# Patient Record
Sex: MALE | Race: WHITE | Employment: FULL TIME | URBAN - METROPOLITAN AREA
[De-identification: names, ages, dates, MRNs, and addresses within clinical notes are randomized per-mention and may not be internally consistent; named-entity substitution may affect disease eponyms.]

---

## 2023-01-06 ENCOUNTER — APPOINTMENT (OUTPATIENT)
Dept: CT IMAGING | Age: 53
End: 2023-01-06
Payer: COMMERCIAL

## 2023-01-06 ENCOUNTER — APPOINTMENT (OUTPATIENT)
Dept: ULTRASOUND IMAGING | Age: 53
End: 2023-01-06
Payer: COMMERCIAL

## 2023-01-06 ENCOUNTER — HOSPITAL ENCOUNTER (EMERGENCY)
Age: 53
Discharge: HOME | End: 2023-01-06
Attending: EMERGENCY MEDICINE
Payer: COMMERCIAL

## 2023-01-06 ENCOUNTER — APPOINTMENT (OUTPATIENT)
Dept: GENERAL RADIOLOGY | Age: 53
End: 2023-01-06
Payer: COMMERCIAL

## 2023-01-06 VITALS
DIASTOLIC BLOOD PRESSURE: 96 MMHG | OXYGEN SATURATION: 97 % | SYSTOLIC BLOOD PRESSURE: 149 MMHG | BODY MASS INDEX: 38.91 KG/M2 | RESPIRATION RATE: 18 BRPM | HEIGHT: 73 IN | TEMPERATURE: 98.2 F | HEART RATE: 75 BPM | WEIGHT: 293.6 LBS

## 2023-01-06 DIAGNOSIS — Z72.0 TOBACCO ABUSE: ICD-10-CM

## 2023-01-06 DIAGNOSIS — J18.9 PNEUMONIA OF BOTH LUNGS DUE TO INFECTIOUS ORGANISM, UNSPECIFIED PART OF LUNG: Primary | ICD-10-CM

## 2023-01-06 LAB
ADENOVIRUS BY PCR: NOT DETECTED
ALBUMIN SERPL-MCNC: 4 G/DL (ref 3.5–5.2)
ALP BLD-CCNC: 77 U/L (ref 40–129)
ALT SERPL-CCNC: 29 U/L (ref 0–40)
ANION GAP SERPL CALCULATED.3IONS-SCNC: 11 MMOL/L (ref 7–16)
AST SERPL-CCNC: 30 U/L (ref 0–39)
BASOPHILS ABSOLUTE: 0.02 E9/L (ref 0–0.2)
BASOPHILS RELATIVE PERCENT: 0.3 % (ref 0–2)
BILIRUB SERPL-MCNC: 0.4 MG/DL (ref 0–1.2)
BORDETELLA PARAPERTUSSIS BY PCR: NOT DETECTED
BORDETELLA PERTUSSIS BY PCR: NOT DETECTED
BUN BLDV-MCNC: 12 MG/DL (ref 6–20)
CALCIUM SERPL-MCNC: 9.3 MG/DL (ref 8.6–10.2)
CHLAMYDOPHILIA PNEUMONIAE BY PCR: NOT DETECTED
CHLORIDE BLD-SCNC: 99 MMOL/L (ref 98–107)
CO2: 24 MMOL/L (ref 22–29)
CORONAVIRUS 229E BY PCR: NOT DETECTED
CORONAVIRUS HKU1 BY PCR: NOT DETECTED
CORONAVIRUS NL63 BY PCR: NOT DETECTED
CORONAVIRUS OC43 BY PCR: NOT DETECTED
CREAT SERPL-MCNC: 0.8 MG/DL (ref 0.7–1.2)
D DIMER: 213 NG/ML DDU
EKG ATRIAL RATE: 85 BPM
EKG P AXIS: 82 DEGREES
EKG P-R INTERVAL: 148 MS
EKG Q-T INTERVAL: 406 MS
EKG QRS DURATION: 92 MS
EKG QTC CALCULATION (BAZETT): 483 MS
EKG R AXIS: 50 DEGREES
EKG T AXIS: 27 DEGREES
EKG VENTRICULAR RATE: 85 BPM
EOSINOPHILS ABSOLUTE: 0.03 E9/L (ref 0.05–0.5)
EOSINOPHILS RELATIVE PERCENT: 0.4 % (ref 0–6)
GFR SERPL CREATININE-BSD FRML MDRD: >60 ML/MIN/1.73
GLUCOSE BLD-MCNC: 135 MG/DL (ref 74–99)
HCT VFR BLD CALC: 44.1 % (ref 37–54)
HEMOGLOBIN: 15.1 G/DL (ref 12.5–16.5)
HUMAN METAPNEUMOVIRUS BY PCR: NOT DETECTED
HUMAN RHINOVIRUS/ENTEROVIRUS BY PCR: NOT DETECTED
IMMATURE GRANULOCYTES #: 0.03 E9/L
IMMATURE GRANULOCYTES %: 0.4 % (ref 0–5)
INFLUENZA A H1-2009 BY PCR: DETECTED
INFLUENZA B BY PCR: NOT DETECTED
LACTIC ACID: 1.1 MMOL/L (ref 0.5–2.2)
LIPASE: 28 U/L (ref 13–60)
LYMPHOCYTES ABSOLUTE: 0.97 E9/L (ref 1.5–4)
LYMPHOCYTES RELATIVE PERCENT: 13.1 % (ref 20–42)
MCH RBC QN AUTO: 32.8 PG (ref 26–35)
MCHC RBC AUTO-ENTMCNC: 34.2 % (ref 32–34.5)
MCV RBC AUTO: 95.7 FL (ref 80–99.9)
MONOCYTES ABSOLUTE: 0.73 E9/L (ref 0.1–0.95)
MONOCYTES RELATIVE PERCENT: 9.8 % (ref 2–12)
MYCOPLASMA PNEUMONIAE BY PCR: NOT DETECTED
NEUTROPHILS ABSOLUTE: 5.65 E9/L (ref 1.8–7.3)
NEUTROPHILS RELATIVE PERCENT: 76 % (ref 43–80)
PARAINFLUENZA VIRUS 1 BY PCR: NOT DETECTED
PARAINFLUENZA VIRUS 2 BY PCR: NOT DETECTED
PARAINFLUENZA VIRUS 3 BY PCR: NOT DETECTED
PARAINFLUENZA VIRUS 4 BY PCR: NOT DETECTED
PDW BLD-RTO: 12.5 FL (ref 11.5–15)
PLATELET # BLD: 164 E9/L (ref 130–450)
PMV BLD AUTO: 10.9 FL (ref 7–12)
POTASSIUM SERPL-SCNC: 4.7 MMOL/L (ref 3.5–5)
RBC # BLD: 4.61 E12/L (ref 3.8–5.8)
RESPIRATORY SYNCYTIAL VIRUS BY PCR: NOT DETECTED
SARS-COV-2, NAAT: NOT DETECTED
SARS-COV-2, PCR: NOT DETECTED
SODIUM BLD-SCNC: 134 MMOL/L (ref 132–146)
TOTAL PROTEIN: 7.8 G/DL (ref 6.4–8.3)
TROPONIN, HIGH SENSITIVITY: <6 NG/L (ref 0–11)
WBC # BLD: 7.4 E9/L (ref 4.5–11.5)

## 2023-01-06 PROCEDURE — 96375 TX/PRO/DX INJ NEW DRUG ADDON: CPT

## 2023-01-06 PROCEDURE — 80053 COMPREHEN METABOLIC PANEL: CPT

## 2023-01-06 PROCEDURE — 99285 EMERGENCY DEPT VISIT HI MDM: CPT

## 2023-01-06 PROCEDURE — 6360000002 HC RX W HCPCS: Performed by: EMERGENCY MEDICINE

## 2023-01-06 PROCEDURE — 84484 ASSAY OF TROPONIN QUANT: CPT

## 2023-01-06 PROCEDURE — 76705 ECHO EXAM OF ABDOMEN: CPT

## 2023-01-06 PROCEDURE — 85025 COMPLETE CBC W/AUTO DIFF WBC: CPT

## 2023-01-06 PROCEDURE — 93010 ELECTROCARDIOGRAM REPORT: CPT | Performed by: INTERNAL MEDICINE

## 2023-01-06 PROCEDURE — 6360000002 HC RX W HCPCS

## 2023-01-06 PROCEDURE — 96374 THER/PROPH/DIAG INJ IV PUSH: CPT

## 2023-01-06 PROCEDURE — 93005 ELECTROCARDIOGRAM TRACING: CPT | Performed by: EMERGENCY MEDICINE

## 2023-01-06 PROCEDURE — 74174 CTA ABD&PLVS W/CONTRAST: CPT

## 2023-01-06 PROCEDURE — 83605 ASSAY OF LACTIC ACID: CPT

## 2023-01-06 PROCEDURE — 83690 ASSAY OF LIPASE: CPT

## 2023-01-06 PROCEDURE — 71045 X-RAY EXAM CHEST 1 VIEW: CPT

## 2023-01-06 PROCEDURE — 6370000000 HC RX 637 (ALT 250 FOR IP): Performed by: EMERGENCY MEDICINE

## 2023-01-06 PROCEDURE — 6360000004 HC RX CONTRAST MEDICATION: Performed by: RADIOLOGY

## 2023-01-06 PROCEDURE — 71275 CT ANGIOGRAPHY CHEST: CPT

## 2023-01-06 PROCEDURE — 2580000003 HC RX 258

## 2023-01-06 PROCEDURE — 85378 FIBRIN DEGRADE SEMIQUANT: CPT

## 2023-01-06 PROCEDURE — 0202U NFCT DS 22 TRGT SARS-COV-2: CPT

## 2023-01-06 PROCEDURE — 87635 SARS-COV-2 COVID-19 AMP PRB: CPT

## 2023-01-06 PROCEDURE — 2580000003 HC RX 258: Performed by: EMERGENCY MEDICINE

## 2023-01-06 RX ORDER — MORPHINE SULFATE 4 MG/ML
4 INJECTION, SOLUTION INTRAMUSCULAR; INTRAVENOUS EVERY 4 HOURS PRN
Status: DISCONTINUED | OUTPATIENT
Start: 2023-01-06 | End: 2023-01-06

## 2023-01-06 RX ORDER — MORPHINE SULFATE 4 MG/ML
4 INJECTION, SOLUTION INTRAMUSCULAR; INTRAVENOUS ONCE
Status: COMPLETED | OUTPATIENT
Start: 2023-01-06 | End: 2023-01-06

## 2023-01-06 RX ORDER — DEXAMETHASONE SODIUM PHOSPHATE 10 MG/ML
10 INJECTION INTRAMUSCULAR; INTRAVENOUS ONCE
Status: COMPLETED | OUTPATIENT
Start: 2023-01-06 | End: 2023-01-06

## 2023-01-06 RX ORDER — DOXYCYCLINE HYCLATE 100 MG/1
100 CAPSULE ORAL ONCE
Status: COMPLETED | OUTPATIENT
Start: 2023-01-06 | End: 2023-01-06

## 2023-01-06 RX ORDER — ALBUTEROL SULFATE 90 UG/1
2 AEROSOL, METERED RESPIRATORY (INHALATION) 4 TIMES DAILY PRN
Qty: 18 G | Refills: 2 | Status: SHIPPED | OUTPATIENT
Start: 2023-01-06

## 2023-01-06 RX ORDER — KETOROLAC TROMETHAMINE 30 MG/ML
15 INJECTION, SOLUTION INTRAMUSCULAR; INTRAVENOUS ONCE
Status: COMPLETED | OUTPATIENT
Start: 2023-01-06 | End: 2023-01-06

## 2023-01-06 RX ORDER — KETOROLAC TROMETHAMINE 10 MG/1
10 TABLET, FILM COATED ORAL EVERY 8 HOURS PRN
Qty: 20 TABLET | Refills: 0 | Status: SHIPPED | OUTPATIENT
Start: 2023-01-06

## 2023-01-06 RX ORDER — ONDANSETRON 2 MG/ML
4 INJECTION INTRAMUSCULAR; INTRAVENOUS ONCE
Status: COMPLETED | OUTPATIENT
Start: 2023-01-06 | End: 2023-01-06

## 2023-01-06 RX ORDER — PREDNISONE 20 MG/1
TABLET ORAL
Qty: 18 TABLET | Refills: 0 | Status: SHIPPED | OUTPATIENT
Start: 2023-01-06 | End: 2023-01-16

## 2023-01-06 RX ORDER — CEFDINIR 300 MG/1
300 CAPSULE ORAL 2 TIMES DAILY
Qty: 20 CAPSULE | Refills: 0 | Status: SHIPPED | OUTPATIENT
Start: 2023-01-06 | End: 2023-01-16

## 2023-01-06 RX ORDER — BUDESONIDE AND FORMOTEROL FUMARATE DIHYDRATE 160; 4.5 UG/1; UG/1
2 AEROSOL RESPIRATORY (INHALATION) 2 TIMES DAILY
Qty: 10.2 G | Refills: 0 | Status: SHIPPED | OUTPATIENT
Start: 2023-01-06

## 2023-01-06 RX ORDER — DOXYCYCLINE HYCLATE 100 MG
100 TABLET ORAL 2 TIMES DAILY
Qty: 20 TABLET | Refills: 0 | Status: SHIPPED | OUTPATIENT
Start: 2023-01-06 | End: 2023-01-16

## 2023-01-06 RX ORDER — ONDANSETRON 2 MG/ML
4 INJECTION INTRAMUSCULAR; INTRAVENOUS EVERY 6 HOURS PRN
Status: DISCONTINUED | OUTPATIENT
Start: 2023-01-06 | End: 2023-01-06

## 2023-01-06 RX ORDER — SODIUM CHLORIDE 9 MG/ML
INJECTION, SOLUTION INTRAVENOUS ONCE
Status: COMPLETED | OUTPATIENT
Start: 2023-01-06 | End: 2023-01-06

## 2023-01-06 RX ADMIN — MORPHINE SULFATE 4 MG: 4 INJECTION, SOLUTION INTRAMUSCULAR; INTRAVENOUS at 08:36

## 2023-01-06 RX ADMIN — HYDROMORPHONE HYDROCHLORIDE 1 MG: 1 INJECTION, SOLUTION INTRAMUSCULAR; INTRAVENOUS; SUBCUTANEOUS at 09:58

## 2023-01-06 RX ADMIN — SODIUM CHLORIDE: 9 INJECTION, SOLUTION INTRAVENOUS at 08:38

## 2023-01-06 RX ADMIN — IOPAMIDOL 75 ML: 755 INJECTION, SOLUTION INTRAVENOUS at 10:29

## 2023-01-06 RX ADMIN — DOXYCYCLINE HYCLATE 100 MG: 100 CAPSULE ORAL at 14:47

## 2023-01-06 RX ADMIN — KETOROLAC TROMETHAMINE 15 MG: 30 INJECTION, SOLUTION INTRAMUSCULAR; INTRAVENOUS at 12:51

## 2023-01-06 RX ADMIN — ONDANSETRON 4 MG: 2 INJECTION INTRAMUSCULAR; INTRAVENOUS at 08:34

## 2023-01-06 RX ADMIN — DEXAMETHASONE SODIUM PHOSPHATE 10 MG: 10 INJECTION INTRAMUSCULAR; INTRAVENOUS at 12:53

## 2023-01-06 RX ADMIN — WATER 2000 MG: 1 INJECTION INTRAMUSCULAR; INTRAVENOUS; SUBCUTANEOUS at 14:47

## 2023-01-06 ASSESSMENT — PAIN DESCRIPTION - LOCATION
LOCATION: ABDOMEN;FLANK

## 2023-01-06 ASSESSMENT — PAIN DESCRIPTION - ORIENTATION
ORIENTATION: RIGHT

## 2023-01-06 ASSESSMENT — PAIN SCALES - GENERAL
PAINLEVEL_OUTOF10: 10

## 2023-01-06 ASSESSMENT — PAIN - FUNCTIONAL ASSESSMENT: PAIN_FUNCTIONAL_ASSESSMENT: 0-10

## 2023-01-06 ASSESSMENT — PAIN DESCRIPTION - PAIN TYPE: TYPE: ACUTE PAIN

## 2023-01-06 NOTE — DISCHARGE INSTRUCTIONS
Call family doctor tomorrow and schedule a followup appointment to be seen in 2 days    Call lung doctor to schedule an appointment to be seen in 1 week    Have your doctor obtain  finalized report of all test results    Increase your fluid intake. [employer] or [employer] [place] (6-890.535.5602) if you want help quitting. This is a free telephone support service that can help people who want to stop smoking or using tobacco. Callers are routed to their state quitlines, which offer several types of quit information and services. These may include:     Free support, advice, and counseling from experienced quitline coaches  A personalized quit plan  Practical information on how to quit, including ways to cope with nicotine withdrawal  The latest information about stop-smoking medications  Free or discounted medications (available for at least some callers in most states)  Referrals to other resources  Mailed self-help materials      CTA CHEST W CONTRAST   Final Result   No CT evidence of acute intra-abdominal or pelvic abnormality. There is no   abdominal aortic aneurysm, dissection, or intimal flap. The pelvic vessels   are unremarkable. Ground-glass opacity identified in a patchy distribution seen within the   upper lung fields bilaterally and centrally within the right lower lobe. Findings suggesting an infectious process in which atypical pneumonia or   viral pneumonia cannot be completely excluded. No consolidative infiltrate. Bronchial wall thickening in the perihilar regions bilaterally suggesting a   nonspecific bronchiolitis. No thoracic aortic aneurysm, dissection, or   intimal flap. CTA ABDOMEN PELVIS W CONTRAST   Final Result   No CT evidence of acute intra-abdominal or pelvic abnormality. There is no   abdominal aortic aneurysm, dissection, or intimal flap. The pelvic vessels   are unremarkable.       Ground-glass opacity identified in a patchy distribution seen within the   upper lung fields bilaterally and centrally within the right lower lobe. Findings suggesting an infectious process in which atypical pneumonia or   viral pneumonia cannot be completely excluded. No consolidative infiltrate. Bronchial wall thickening in the perihilar regions bilaterally suggesting a   nonspecific bronchiolitis. No thoracic aortic aneurysm, dissection, or   intimal flap. US ABDOMEN LIMITED Specify organ? GALLBLADDER   Final Result   1. Suboptimal study. No ultrasound evidence of acute cholecystitis. 2.  Common bile duct measures 3 mm. 3.  Mild hepatomegaly. XR CHEST PORTABLE   Final Result   No acute process. A small area on the left can be reimaged at no additional   charge if so desired. See above.

## 2023-01-06 NOTE — Clinical Note
Bekah Mays was seen and treated in our emergency department on 1/6/2023. He may return to work on 01/11/2023. If you have any questions or concerns, please don't hesitate to call.       Soledad Madison, DO

## 2023-01-06 NOTE — Clinical Note
Kimmy Canas was seen and treated in our emergency department on 1/6/2023. He may return to work on 01/11/2023. If you have any questions or concerns, please don't hesitate to call.       Solo Espinal, DO

## 2023-01-06 NOTE — ED PROVIDER NOTES
[facility] ENCOUNTER        Pt Name: Lucie Rodriguez  MRN: [id number]  [place] 1970  Date of evaluation: 1/6/2023  Provider: Rosa Maria Dubon MD  PCP: No primary care provider on file. Note Started: 8:02 AM EST 1/6/23    CHIEF COMPLAINT       Chief Complaint   Patient presents with    Shortness of Breath     Sudden onset pain pain under rt scapula and around rt side       HISTORY OF PRESENT ILLNESS: 1 or more Elements     History from : Patient    Limitations to history : None    Lucie Rodriguez is a 47 y.o. male who presents with abdominal pain. Patient reports sudden onset right upper quadrant pain that started yesterday night, quality is sharp, that radiates to the flank and to the right shoulder, aggravated by bending and twisting, alleviated by nothing. Associated symptoms include some fever, chills. No chest pain, shortness of breath, nausea, vomiting reported. Patient reports of a smoking history (1 pack per 3 days), alcohol history (3-6 beers nightly), intermittent marijuana use. Did not get shingles shot. Patient reports gastric surgery history when he was an infant. Nursing Notes were all reviewed and agreed with or any disagreements were addressed in the HPI. REVIEW OF SYSTEMS :      Review of Systems   Constitutional:  Positive for chills and fever. HENT:  Negative for congestion, rhinorrhea and sore throat. Eyes:  Negative for pain and visual disturbance. Respiratory:  Negative for shortness of breath and wheezing. Cardiovascular:  Negative for chest pain and palpitations. Gastrointestinal:  Positive for abdominal pain. Negative for diarrhea, nausea and vomiting. Genitourinary:  Negative for dysuria and hematuria. Musculoskeletal:  Negative for arthralgias and myalgias. Skin:  Negative for rash. Neurological:  Negative for dizziness and headaches.      Positives and Pertinent negatives as per HPI.     SURGICAL HISTORY     Past Surgical History:   Procedure Laterality Date    STOMACH SURGERY         CURRENTMEDICATIONS       Discharge Medication List as of 1/6/2023  3:07 PM          ALLERGIES     Penicillins    FAMILYHISTORY     No family history on file. SOCIAL HISTORY          SCREENINGS        Gibson Coma Scale  Eye Opening: Spontaneous  Best Verbal Response: Oriented  Best Motor Response: Obeys commands  Gibson Coma Scale Score: 15                CIWA Assessment  BP: (!) 149/96  Heart Rate: 75           PHYSICAL EXAM  1 or more Elements     ED Triage Vitals [01/06/23 0720]   BP Temp Temp Source Heart Rate Resp SpO2 Height Weight   (!) 121/90 98.2 °F (36.8 °C) Bladder 88 16 100 % 6' 1\" (1.854 m) 293 lb 9.6 oz (133.2 kg)       Physical Exam  Vitals and nursing note reviewed. Constitutional:       General: He is in acute distress. Appearance: Normal appearance. He is obese. HENT:      Head: Normocephalic and atraumatic. Eyes:      General:         Right eye: No discharge. Left eye: No discharge. Cardiovascular:      Rate and Rhythm: Normal rate and regular rhythm. Heart sounds: No murmur heard. Pulmonary:      Effort: Pulmonary effort is normal.      Breath sounds: Examination of the right-upper field reveals wheezing. Examination of the left-upper field reveals wheezing. Wheezing present. Chest:      Chest wall: Tenderness (right rib tender to palpation w/ flank to back lower ribs) present. Comments: No rash on chest wall  Abdominal:      General: Bowel sounds are normal.      Palpations: Abdomen is soft. There is no mass. Tenderness: There is abdominal tenderness. There is no guarding or rebound. Musculoskeletal:      Cervical back: Normal range of motion. Right lower leg: No edema. Left lower leg: No edema. Skin:     General: Skin is warm and dry. Neurological:      General: No focal deficit present.       Mental Status: He is alert and oriented to person, place, and time. DIAGNOSTIC RESULTS   LABS:    Labs Reviewed   CBC WITH AUTO DIFFERENTIAL - Abnormal; Notable for the following components:       Result Value    Lymphocytes % 13.1 (*)     Lymphocytes Absolute 0.97 (*)     Eosinophils Absolute 0.03 (*)     All other components within normal limits   COMPREHENSIVE METABOLIC PANEL - Abnormal; Notable for the following components:    Glucose 135 (*)     All other components within normal limits   COVID-19, RAPID   RESPIRATORY PANEL, MOLECULAR, WITH COVID-19   LIPASE   D-DIMER, QUANTITATIVE   LACTIC ACID   TROPONIN       When ordered only abnormal lab results are displayed. All other labs were within normal range or not returned as of this dictation. EKG:     Normal sinus rhythm    RADIOLOGY:   Non-plain film images such as CT, Ultrasound and MRI are read by the radiologist. Plain radiographic images are visualized and preliminarily interpreted by the ED Provider with the below findings:    Luminary imaging reviewed by us, confirmed by radiology    Interpretation per the Radiologist below, if available at the time of this note:    CTA CHEST W CONTRAST   Final Result   No CT evidence of acute intra-abdominal or pelvic abnormality. There is no   abdominal aortic aneurysm, dissection, or intimal flap. The pelvic vessels   are unremarkable. Ground-glass opacity identified in a patchy distribution seen within the   upper lung fields bilaterally and centrally within the right lower lobe. Findings suggesting an infectious process in which atypical pneumonia or   viral pneumonia cannot be completely excluded. No consolidative infiltrate. Bronchial wall thickening in the perihilar regions bilaterally suggesting a   nonspecific bronchiolitis. No thoracic aortic aneurysm, dissection, or   intimal flap. CTA ABDOMEN PELVIS W CONTRAST   Final Result   No CT evidence of acute intra-abdominal or pelvic abnormality.   There is no abdominal aortic aneurysm, dissection, or intimal flap. The pelvic vessels   are unremarkable. Ground-glass opacity identified in a patchy distribution seen within the   upper lung fields bilaterally and centrally within the right lower lobe. Findings suggesting an infectious process in which atypical pneumonia or   viral pneumonia cannot be completely excluded. No consolidative infiltrate. Bronchial wall thickening in the perihilar regions bilaterally suggesting a   nonspecific bronchiolitis. No thoracic aortic aneurysm, dissection, or   intimal flap. US ABDOMEN LIMITED Specify organ? GALLBLADDER   Final Result   1. Suboptimal study. No ultrasound evidence of acute cholecystitis. 2.  Common bile duct measures 3 mm. 3.  Mild hepatomegaly. XR CHEST PORTABLE   Final Result   No acute process. A small area on the left can be reimaged at no additional   charge if so desired. See above. No results found. No results found. PROCEDURES   Unless otherwise noted below, none     Procedures    CRITICAL CARE TIME       PAST MEDICAL HISTORY      has a past medical history of Obesity (BMI 35.0-39.9 without comorbidity).      EMERGENCY DEPARTMENT COURSE and DIFFERENTIAL DIAGNOSIS/MDM:   Vitals:    Vitals:    01/06/23 1126 01/06/23 1251 01/06/23 1430 01/06/23 1450   BP:  (!) 142/84 (!) 148/93 (!) 149/96   Pulse: 87 79 74 75   Resp: 19 20 20 18   Temp:       TempSrc:       SpO2: 99% 99% 98% 97%   Weight:       Height:           Patient was given the following medications:  Medications   0.9 % sodium chloride infusion (0 mL/hr IntraVENous Stopped 1/6/23 1525)   morphine sulfate (PF) injection 4 mg (4 mg IntraVENous Given 1/6/23 0836)   ondansetron (ZOFRAN) injection 4 mg (4 mg IntraVENous Given 1/6/23 0834)   HYDROmorphone (DILAUDID) injection 1 mg (1 mg IntraVENous Given 1/6/23 0958)   iopamidol (ISOVUE-370) 76 % injection 75 mL (75 mLs IntraVENous Given 1/6/23 1029) ketorolac (TORADOL) injection 15 mg (15 mg IntraVENous Given 23 1251)   dexamethasone (DECADRON) injection 10 mg (10 mg IntraVENous Given 23 1253)   doxycycline hyclate (VIBRAMYCIN) capsule 100 mg (100 mg Oral Given 23 1447)   cefTRIAXone (ROCEPHIN) 2,000 mg in sterile water 20 mL IV syringe (2,000 mg IntraVENous Given 23 1447)       ED Course as of 23 1534      2728 ECam  Normal sinus rhythm  Hr 85  Qtc prolonged, no ischemic findings, normal axis  Stable ecg [AYLIN]   7236 Discussed patient's work-up including ultrasound which showed no evidence of acute pathology. Patient still reports significant pain in his right flank. Will order CT of this abdomen pelvis and chest to rule out dissection. We will give him Dilaudid and reevaluate patient. [AD]   4057 Spoke to patient and patient's wife, updated them on the results of imaging, ruling out any acute processes but showing concern for pneumonia. Patient still complains about significant pain on the right rib region, reporting Dilaudid helps for about an hour. Give patient Toradol and reevaluate. [AD]   0266 Evaluated patient after giving him steroids and Toradol. Patient reports that he is feeling better although still complains of some discomfort in his rib cage but is generally improving.  [AD]   4947 Discussed with patient plan of care. Patient tolerated walking pulse ox. Patient reports feeling much better after Toradol shot. [AD]   3793 I did personally discuss and counseled patient on smoking cessation and benefits thereof:    Lowered risk for lung cancer and many other types of cancer. Reduced risk for heart disease, stroke, and peripheral vascular disease (narrowing of the blood vessels outside your heart). Reduced heart disease risk within 1 to 2 years of quitting. Reduced respiratory symptoms, such as coughing, wheezing, and shortness of breath.  While these symptoms may not disappear, they do not continue to progress at the same rate among people who quit compared with those who continue to smoke. Reduced risk of developing some lung diseases (such as chronic obstructive pulmonary disease, also known as COPD, one of the leading causes of death in the [place]). Reduced risk for infertility in women of childbearing age. Women who stop smoking during pregnancy also reduce their risk of having a low birth weight baby. I also discussed benefits on counseling and Nicotine replacement products which include  Over-the-counter (nicotine patch [which is also available by prescription], gum, lozenge) and Prescription (nicotine patch, inhaler, nasal spray) and  Prescription non-nicotine medications: bupropion SR (Zyban®),6 varenicline tartrate (Chantix®)    He was given the following    [employer] 1-800-QUIT-NOWexternal icon (1-177.565.9955) if you want help quitting. This is a free telephone support service that can help people who want to stop smoking or using tobacco. Callers are routed to their state quitlines, which offer several types of quit information and services. These may include:     Free support, advice, and counseling from experienced quitline coaches  A personalized quit plan  Practical information on how to quit, including ways to cope with nicotine withdrawal  The latest information about stop-smoking medications  Free or discounted medications (available for at least some callers in most states)  Referrals to other resources  Mailed self-help materials     [AYLIN]      ED Course User Index  [AD] Shaan Cortés MD  [AYLIN] Roque Alexandero, DO        [unfilled]    Chronic Conditions:     CONSULTS: (Who and What was discussed)  None    CC/HPI Summary, DDx, ED Course, and Reassessment: 50-year-old male presenting with right upper quadrant pain. Differentials include gastric ulcer, cholecystitis, pancreatitis, MSK, pneumothorax, to name a few.   Upon further investigation, work-up ruled out any acute concerning pathology but imaging was concerning for pneumonia. Patient was swabbed for COVID which was negative and underwent RFA panel. Patient was given antibiotics for pneumonia and we will continue antibiotic course on an outpatient setting. Patient was given fluids, pain medication, steroids during ED course. Upon reassessment patient reported improved symptoms. Discussed with patient results, including blood work and imaging. Patient will follow-up with PCP in the next 2 to 3 days. Patient counseled on smoking cessation, was given time for questions and verbalized understanding with plan going forward. Patient will be discharged home in stable condition. Disposition Considerations (tests considered but not done, Shared Decision Making, Pt Expectation of Test or Tx.):       I am the Primary Clinician of Record. FINAL IMPRESSION      1. Pneumonia of both lungs due to infectious organism, unspecified part of lung    2. Tobacco abuse          DISPOSITION/PLAN     DISPOSITION Decision To Discharge 01/06/2023 03:08:52 PM      PATIENT REFERRED TO:  Javier Harris MD  [address]  [phone]    Schedule an appointment as soon as possible for a visit   family medicine    Rowena Pizano DO  [address] [facility]  [phone]    Schedule an appointment as soon as possible for a visit       DISCHARGE MEDICATIONS:  Discharge Medication List as of 1/6/2023  3:07 PM        START taking these medications    Details   predniSONE (DELTASONE) 20 MG tablet Sig.: Take 60mg  Po qd x 3 days, then 40mg po qd x3 days, then 20mg po qd x 3 days.   QS x 9 days, Disp-18 tablet, R-0Print      albuterol sulfate HFA (VENTOLIN HFA) 108 (90 Base) MCG/ACT inhaler Inhale 2 puffs into the lungs 4 times daily as needed for Wheezing, Disp-18 g, R-2Print      ketorolac (TORADOL) 10 MG tablet Take 1 tablet by mouth every 8 hours as needed for Pain, Disp-20 tablet, R-0Print      budesonide-formoterol (SYMBICORT) 160-4.5 MCG/ACT AERO Inhale 2 puffs into the lungs 2 times daily, Disp-10.2 g, R-0Print      cefdinir (OMNICEF) 300 MG capsule Take 1 capsule by mouth 2 times daily for 10 days, Disp-20 capsule, R-0Print      doxycycline hyclate (VIBRA-TABS) 100 MG tablet Take 1 tablet by mouth 2 times daily for 10 days, Disp-20 tablet, R-0Print             DISCONTINUED MEDICATIONS:  Discharge Medication List as of 1/6/2023  3:07 PM                 (Please note that portions of this note were completed with a voice recognition program.  Efforts were made to edit the dictations but occasionally words are mis-transcribed.)    Yoanna Malcolm MD (electronically signed)          Yoanna Malcolm MD  Resident  01/06/23 6253

## 2023-01-16 SDOH — HEALTH STABILITY: PHYSICAL HEALTH: ON AVERAGE, HOW MANY MINUTES DO YOU ENGAGE IN EXERCISE AT THIS LEVEL?: 30 MIN

## 2023-01-16 SDOH — HEALTH STABILITY: PHYSICAL HEALTH: ON AVERAGE, HOW MANY DAYS PER WEEK DO YOU ENGAGE IN MODERATE TO STRENUOUS EXERCISE (LIKE A BRISK WALK)?: 5 DAYS

## 2023-01-16 ASSESSMENT — SOCIAL DETERMINANTS OF HEALTH (SDOH)
WITHIN THE LAST YEAR, HAVE TO BEEN RAPED OR FORCED TO HAVE ANY KIND OF SEXUAL ACTIVITY BY YOUR PARTNER OR EX-PARTNER?: NO
WITHIN THE LAST YEAR, HAVE YOU BEEN HUMILIATED OR EMOTIONALLY ABUSED IN OTHER WAYS BY YOUR PARTNER OR EX-PARTNER?: NO
WITHIN THE LAST YEAR, HAVE YOU BEEN KICKED, HIT, SLAPPED, OR OTHERWISE PHYSICALLY HURT BY YOUR PARTNER OR EX-PARTNER?: NO
WITHIN THE LAST YEAR, HAVE YOU BEEN AFRAID OF YOUR PARTNER OR EX-PARTNER?: NO

## 2023-01-17 ENCOUNTER — OFFICE VISIT (OUTPATIENT)
Dept: FAMILY MEDICINE CLINIC | Age: 53
End: 2023-01-17
Payer: COMMERCIAL

## 2023-01-17 VITALS
OXYGEN SATURATION: 97 % | WEIGHT: 308 LBS | BODY MASS INDEX: 40.82 KG/M2 | RESPIRATION RATE: 16 BRPM | DIASTOLIC BLOOD PRESSURE: 94 MMHG | HEART RATE: 79 BPM | SYSTOLIC BLOOD PRESSURE: 146 MMHG | HEIGHT: 73 IN | TEMPERATURE: 97.5 F

## 2023-01-17 DIAGNOSIS — Z71.6 ENCOUNTER FOR SMOKING CESSATION COUNSELING: ICD-10-CM

## 2023-01-17 DIAGNOSIS — R73.02 IMPAIRED GLUCOSE TOLERANCE: ICD-10-CM

## 2023-01-17 DIAGNOSIS — E66.01 CLASS 3 SEVERE OBESITY DUE TO EXCESS CALORIES WITHOUT SERIOUS COMORBIDITY WITH BODY MASS INDEX (BMI) OF 40.0 TO 44.9 IN ADULT (HCC): ICD-10-CM

## 2023-01-17 DIAGNOSIS — F10.10 ALCOHOL ABUSE: ICD-10-CM

## 2023-01-17 DIAGNOSIS — I10 ESSENTIAL HYPERTENSION: Primary | ICD-10-CM

## 2023-01-17 PROBLEM — E66.813 CLASS 3 SEVERE OBESITY DUE TO EXCESS CALORIES WITHOUT SERIOUS COMORBIDITY WITH BODY MASS INDEX (BMI) OF 40.0 TO 44.9 IN ADULT: Status: ACTIVE | Noted: 2023-01-17

## 2023-01-17 LAB — HBA1C MFR BLD: 6 %

## 2023-01-17 PROCEDURE — 99203 OFFICE O/P NEW LOW 30 MIN: CPT | Performed by: FAMILY MEDICINE

## 2023-01-17 PROCEDURE — 3078F DIAST BP <80 MM HG: CPT | Performed by: FAMILY MEDICINE

## 2023-01-17 PROCEDURE — 83036 HEMOGLOBIN GLYCOSYLATED A1C: CPT | Performed by: FAMILY MEDICINE

## 2023-01-17 PROCEDURE — 4004F PT TOBACCO SCREEN RCVD TLK: CPT | Performed by: FAMILY MEDICINE

## 2023-01-17 PROCEDURE — G8427 DOCREV CUR MEDS BY ELIG CLIN: HCPCS | Performed by: FAMILY MEDICINE

## 2023-01-17 PROCEDURE — G8484 FLU IMMUNIZE NO ADMIN: HCPCS | Performed by: FAMILY MEDICINE

## 2023-01-17 PROCEDURE — 3017F COLORECTAL CA SCREEN DOC REV: CPT | Performed by: FAMILY MEDICINE

## 2023-01-17 PROCEDURE — G8417 CALC BMI ABV UP PARAM F/U: HCPCS | Performed by: FAMILY MEDICINE

## 2023-01-17 PROCEDURE — 3074F SYST BP LT 130 MM HG: CPT | Performed by: FAMILY MEDICINE

## 2023-01-17 RX ORDER — HYDROCHLOROTHIAZIDE 12.5 MG/1
12.5 CAPSULE, GELATIN COATED ORAL EVERY MORNING
Qty: 90 CAPSULE | Refills: 1 | Status: SHIPPED | OUTPATIENT
Start: 2023-01-17

## 2023-01-17 RX ORDER — BUPROPION HYDROCHLORIDE 150 MG/1
150 TABLET ORAL EVERY MORNING
Qty: 30 TABLET | Refills: 3 | Status: SHIPPED | OUTPATIENT
Start: 2023-01-17

## 2023-01-17 RX ORDER — OMEPRAZOLE 20 MG/1
20 CAPSULE, DELAYED RELEASE ORAL DAILY
COMMUNITY

## 2023-01-17 SDOH — ECONOMIC STABILITY: FOOD INSECURITY: WITHIN THE PAST 12 MONTHS, THE FOOD YOU BOUGHT JUST DIDN'T LAST AND YOU DIDN'T HAVE MONEY TO GET MORE.: NEVER TRUE

## 2023-01-17 SDOH — ECONOMIC STABILITY: FOOD INSECURITY: WITHIN THE PAST 12 MONTHS, YOU WORRIED THAT YOUR FOOD WOULD RUN OUT BEFORE YOU GOT MONEY TO BUY MORE.: NEVER TRUE

## 2023-01-17 ASSESSMENT — PATIENT HEALTH QUESTIONNAIRE - PHQ9
SUM OF ALL RESPONSES TO PHQ QUESTIONS 1-9: 0
1. LITTLE INTEREST OR PLEASURE IN DOING THINGS: 0
SUM OF ALL RESPONSES TO PHQ QUESTIONS 1-9: 0
2. FEELING DOWN, DEPRESSED OR HOPELESS: 0
SUM OF ALL RESPONSES TO PHQ9 QUESTIONS 1 & 2: 0
SUM OF ALL RESPONSES TO PHQ QUESTIONS 1-9: 0
SUM OF ALL RESPONSES TO PHQ QUESTIONS 1-9: 0

## 2023-01-17 ASSESSMENT — SOCIAL DETERMINANTS OF HEALTH (SDOH): HOW HARD IS IT FOR YOU TO PAY FOR THE VERY BASICS LIKE FOOD, HOUSING, MEDICAL CARE, AND HEATING?: NOT HARD AT ALL

## 2023-01-17 NOTE — PROGRESS NOTES
1/22/2023    Gena Gabriel is a 47 y.o. malehere for:    HPI:    Patient is here to establish with us today. He was recently seen in the ER due to right upper quadrant pain which was sharp radiating to his flank area. CBC, CMP, lipase, D-dimer, lactic acid was normal ,COVID and negative RFA. ultrasound was negative for acute cholecystitis normal bile duct diameter and mild hepatomegaly. CTh showed groundglass opacity in upper lung fields bilaterally and centrally with the right lower lobe which could be atypical pneumonia or viral pneumonia. It also showed bronchial wall thickening which can be bronchiolitis. Pt has finished omnicef, doxy, symbicort and is on albuterol prn. Smokes 1 pack per 4 days, alcohol 6 beers every night and intermittent marijuana use. No other medical problems. Patient is not on any medications. Patient denies any allergies. Still having RUQ pain  Not as bad as it was   Still there       BP Readings from Last 3 Encounters:   01/17/23 (!) 146/94   01/06/23 (!) 149/96     Current Outpatient Medications   Medication Sig Dispense Refill    omeprazole (PRILOSEC) 20 MG delayed release capsule Take 20 mg by mouth daily Takes as needed      buPROPion (WELLBUTRIN XL) 150 MG extended release tablet Take 1 tablet by mouth every morning 30 tablet 3    hydroCHLOROthiazide (MICROZIDE) 12.5 MG capsule Take 1 capsule by mouth every morning 90 capsule 1    albuterol sulfate HFA (VENTOLIN HFA) 108 (90 Base) MCG/ACT inhaler Inhale 2 puffs into the lungs 4 times daily as needed for Wheezing 18 g 2    ketorolac (TORADOL) 10 MG tablet Take 1 tablet by mouth every 8 hours as needed for Pain 20 tablet 0    budesonide-formoterol (SYMBICORT) 160-4.5 MCG/ACT AERO Inhale 2 puffs into the lungs 2 times daily 10.2 g 0     No current facility-administered medications for this visit.       Allergies   Allergen Reactions    Penicillins Rash       Past Medical & Surgical History:      Diagnosis Date Obesity (BMI 35.0-39.9 without comorbidity)      Past Surgical History:   Procedure Laterality Date    STOMACH SURGERY         Family History:  No family history on file.    Social History:  Social History     Tobacco Use    Smoking status: Every Day     Packs/day: 0.25     Years: 40.00     Pack years: 10.00     Types: Cigarettes    Smokeless tobacco: Never   Substance Use Topics    Alcohol use: Not on file     Comment: 6 pack a day         There is no immunization history on file for this patient.    Review of Systems   Constitutional:  Negative for chills and fever.   HENT:  Negative for congestion, sore throat and trouble swallowing.    Respiratory:  Negative for cough.    Cardiovascular:  Negative for chest pain and leg swelling.   Gastrointestinal:  Positive for abdominal pain. Negative for constipation, diarrhea, nausea and vomiting.   Genitourinary:  Negative for difficulty urinating.   Musculoskeletal:  Negative for arthralgias and myalgias.   Skin:  Negative for rash and wound.   Neurological:  Negative for dizziness and headaches.   Psychiatric/Behavioral:  Negative for agitation.      VS:  BP (!) 146/94   Pulse 79   Temp 97.5 °F (36.4 °C)   Resp 16   Ht 6' 1\" (1.854 m)   Wt (!) 308 lb (139.7 kg)   SpO2 97%   BMI 40.64 kg/m²     Physical Exam  Constitutional:       Appearance: Normal appearance.   HENT:      Head: Normocephalic.      Nose: Nose normal. No rhinorrhea.   Eyes:      General: No scleral icterus.     Conjunctiva/sclera: Conjunctivae normal.   Cardiovascular:      Rate and Rhythm: Normal rate and regular rhythm.      Pulses: Normal pulses.      Heart sounds: Normal heart sounds. No murmur heard.  Pulmonary:      Breath sounds: Normal breath sounds. No wheezing, rhonchi or rales.   Abdominal:      General: Abdomen is flat. Bowel sounds are normal.   Musculoskeletal:      Right lower leg: No edema.      Left lower leg: No edema.   Skin:     General: Skin is warm.      Findings: No rash.  Neurological:      General: No focal deficit present. Mental Status: He is alert. Assessment/Plan:  1. Impaired glucose tolerance  A1C today is 6.0  At this time pt was given option to start metformin however him and daughter chose to start lifestyle modifications and physical activity  Therefore we will observe pt for 3 months  - POCT glycosylated hemoglobin (Hb A1C)    2. Encounter for smoking cessation counseling  At this time pt we will order PFT   Pt willing to start wellbutrin today, discussed benefits and side efffects  - Full PFT Study With Bronchodilator; Future    3. Class 3 severe obesity due to excess calories without serious comorbidity with body mass index (BMI) of 40.0 to 44.9 in adult [facility])  Labs and screenings today  - Fecal DNA Colorectal cancer screening (Cologuard)  - LIPID PANEL; Future  - POCT glycosylated hemoglobin (Hb A1C)    4. Essential hypertension  At this time we will start pt on hctz daily   Discussed physical activity and salt restriction  - Microalbumin / Creatinine Urine Ratio; Future    5 COPD   Unsure diagnosis and severity, will order PFT at this time   Started wellbutrin for smoking cessation  Continue symbicort in the meantime    RTC 6 weeks     Follow up:  4 week for HTN    Patient agrees with the above stated plan. Kim Arango received counseling on the following healthy behaviors: nutrition, exercise, medication adherence, tobacco cessation, and decrease in alcohol consumption.     Marissa Arthur MD  PGY-3 Family Medicine

## 2023-01-17 NOTE — PROGRESS NOTES
S: 47 y.o. 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 His temperature is 97.5 °F (36.4 °C). His blood pressure is 141/93 (abnormal) and his pulse is 79. His respiration is 16 and oxygen saturation is 97%. BP Readings from Last 3 Encounters:   01/17/23 (!) 141/93   01/06/23 (!) 149/96     See resident note      Impression/Plan:   1) HTN: Start HCTZ 12.5mg, diet/lifestyle interventions, check lipids, decrease alcohol intake  2) Possible COPD: Continue symbicort with albuterol prn, +PFTs, +encourage smoking cessation  3) Tobacco use: Trial of wellbutrin  4) Prediabetes: Diet/lifestyle changes  5) Obesity: Diet/lifestyle changes  6) HM: Cologuard, declines vaccines    RTC 6 weeks         Health Maintenance Due   Topic Date Due    COVID-19 Vaccine (1) Never done    Depression Screen  Never done    HIV screen  Never done    Hepatitis C screen  Never done    DTaP/Tdap/Td vaccine (1 - Tdap) Never done    Diabetes screen  Never done    Lipids  Never done    Colorectal Cancer Screen  Never done    Shingles vaccine (1 of 2) Never done    Flu vaccine (1) Never done         Attending Physician Statement  I have discussed the case, including pertinent history and exam findings with the resident. I agree with the documented assessment and plan.       Rui Tripathi MD

## 2023-01-22 PROBLEM — F10.10 ALCOHOL ABUSE: Status: ACTIVE | Noted: 2023-01-22

## 2023-01-22 ASSESSMENT — ENCOUNTER SYMPTOMS
DIARRHEA: 0
VOMITING: 0
COUGH: 0
CONSTIPATION: 0
NAUSEA: 0
SORE THROAT: 0
ABDOMINAL PAIN: 1
TROUBLE SWALLOWING: 0

## 2023-02-16 DIAGNOSIS — E66.01 CLASS 3 SEVERE OBESITY DUE TO EXCESS CALORIES WITHOUT SERIOUS COMORBIDITY WITH BODY MASS INDEX (BMI) OF 40.0 TO 44.9 IN ADULT (HCC): ICD-10-CM

## 2023-02-16 DIAGNOSIS — I10 ESSENTIAL HYPERTENSION: ICD-10-CM

## 2023-02-16 LAB
CHOLESTEROL, TOTAL: 206 MG/DL (ref 0–199)
CREATININE URINE: 99 MG/DL (ref 40–278)
HDLC SERPL-MCNC: 78 MG/DL
LDL CHOLESTEROL CALCULATED: 86 MG/DL (ref 0–99)
MICROALBUMIN UR-MCNC: 40.8 MG/L
MICROALBUMIN/CREAT UR-RTO: 41.2 (ref 0–30)
TRIGL SERPL-MCNC: 209 MG/DL (ref 0–149)
VLDLC SERPL CALC-MCNC: 42 MG/DL

## 2023-03-04 SDOH — ECONOMIC STABILITY: TRANSPORTATION INSECURITY
IN THE PAST 12 MONTHS, HAS LACK OF TRANSPORTATION KEPT YOU FROM MEETINGS, WORK, OR FROM GETTING THINGS NEEDED FOR DAILY LIVING?: NO

## 2023-03-04 SDOH — ECONOMIC STABILITY: HOUSING INSECURITY
IN THE LAST 12 MONTHS, WAS THERE A TIME WHEN YOU DID NOT HAVE A STEADY PLACE TO SLEEP OR SLEPT IN A SHELTER (INCLUDING NOW)?: NO

## 2023-03-04 SDOH — ECONOMIC STABILITY: FOOD INSECURITY: WITHIN THE PAST 12 MONTHS, YOU WORRIED THAT YOUR FOOD WOULD RUN OUT BEFORE YOU GOT MONEY TO BUY MORE.: NEVER TRUE

## 2023-03-04 SDOH — ECONOMIC STABILITY: INCOME INSECURITY: HOW HARD IS IT FOR YOU TO PAY FOR THE VERY BASICS LIKE FOOD, HOUSING, MEDICAL CARE, AND HEATING?: PATIENT DECLINED

## 2023-03-04 SDOH — ECONOMIC STABILITY: FOOD INSECURITY: WITHIN THE PAST 12 MONTHS, THE FOOD YOU BOUGHT JUST DIDN'T LAST AND YOU DIDN'T HAVE MONEY TO GET MORE.: NEVER TRUE

## 2023-03-06 ENCOUNTER — OFFICE VISIT (OUTPATIENT)
Dept: FAMILY MEDICINE CLINIC | Age: 53
End: 2023-03-06
Payer: COMMERCIAL

## 2023-03-06 VITALS
WEIGHT: 306 LBS | RESPIRATION RATE: 18 BRPM | DIASTOLIC BLOOD PRESSURE: 84 MMHG | SYSTOLIC BLOOD PRESSURE: 138 MMHG | HEART RATE: 72 BPM | HEIGHT: 73 IN | BODY MASS INDEX: 40.56 KG/M2 | OXYGEN SATURATION: 98 %

## 2023-03-06 DIAGNOSIS — R73.03 PREDIABETES: ICD-10-CM

## 2023-03-06 DIAGNOSIS — I10 ESSENTIAL HYPERTENSION: Primary | ICD-10-CM

## 2023-03-06 PROCEDURE — G8417 CALC BMI ABV UP PARAM F/U: HCPCS | Performed by: FAMILY MEDICINE

## 2023-03-06 PROCEDURE — 3074F SYST BP LT 130 MM HG: CPT | Performed by: FAMILY MEDICINE

## 2023-03-06 PROCEDURE — G8484 FLU IMMUNIZE NO ADMIN: HCPCS | Performed by: FAMILY MEDICINE

## 2023-03-06 PROCEDURE — 99214 OFFICE O/P EST MOD 30 MIN: CPT | Performed by: FAMILY MEDICINE

## 2023-03-06 PROCEDURE — 3078F DIAST BP <80 MM HG: CPT | Performed by: FAMILY MEDICINE

## 2023-03-06 PROCEDURE — 3017F COLORECTAL CA SCREEN DOC REV: CPT | Performed by: FAMILY MEDICINE

## 2023-03-06 PROCEDURE — 4004F PT TOBACCO SCREEN RCVD TLK: CPT | Performed by: FAMILY MEDICINE

## 2023-03-06 PROCEDURE — G8427 DOCREV CUR MEDS BY ELIG CLIN: HCPCS | Performed by: FAMILY MEDICINE

## 2023-03-06 RX ORDER — BUDESONIDE AND FORMOTEROL FUMARATE DIHYDRATE 160; 4.5 UG/1; UG/1
2 AEROSOL RESPIRATORY (INHALATION) 2 TIMES DAILY
Qty: 10.2 G | Refills: 0 | Status: SHIPPED | OUTPATIENT
Start: 2023-03-06

## 2023-03-06 RX ORDER — BUDESONIDE AND FORMOTEROL FUMARATE DIHYDRATE 80; 4.5 UG/1; UG/1
AEROSOL RESPIRATORY (INHALATION) 2 TIMES DAILY
Status: CANCELLED | OUTPATIENT
Start: 2023-03-06

## 2023-03-06 NOTE — PROGRESS NOTES
S: 47 y.o. male with   Chief Complaint   Patient presents with    Hypertension       Pt tried to take the HCTZ and felt light headed. His daughter is a nurse and the BP's at home are well controlled. He is watching his salt intake. He walks daily. O: VS:  height is 6' 1\" (1.854 m) and weight is 306 lb (138.8 kg) (abnormal). His blood pressure is 138/84 and his pulse is 72. His respiration is 18 and oxygen saturation is 98%. BP Readings from Last 3 Encounters:   03/06/23 138/84   01/17/23 (!) 146/94   01/06/23 (!) 149/96     See resident note    Impression/Plan:   1) HTN  - controlled by diet and exercise, labs today. 2) preDM - cont to control with diet and exercise  3) SOB - Cont on symbicort. Consider PFT. The 10-year ASCVD risk score (Sridhar NAVA, et al., 2019) is: 7.2%    Values used to calculate the score:      Age: 47 years      Sex: Male      Is Non- : No      Diabetic: No      Tobacco smoker: Yes      Systolic Blood Pressure: 504 mmHg      Is BP treated: No      HDL Cholesterol: 78 mg/dL      Total Cholesterol: 206 mg/dL      Health Maintenance Due   Topic Date Due    COVID-19 Vaccine (1) Never done    Pneumococcal 0-64 years Vaccine (1 - PCV) Never done    HIV screen  Never done    Hepatitis C screen  Never done    DTaP/Tdap/Td vaccine (1 - Tdap) Never done    Colorectal Cancer Screen  Never done    Shingles vaccine (1 of 2) Never done    Flu vaccine (1) Never done         Attending Physician Statement  I have discussed the case, including pertinent history and exam findings with the resident. I agree with the documented assessment and plan.       Grace Briceno MD

## 2023-03-06 NOTE — PROGRESS NOTES
3/8/2023    Patricia Lemus is a 47 y.o. malehere for:    HPI:    Here for HTN fu  Last time he was here he was started on microzide  He said he tried Microzide and took for 9 days but did had some side effects  Was orthostatic lightheaded , hence he stopped taking it  Quit taking it  Checks BP) at home   Has been wathcing his salt   Walking about 1/2- 1 hour , walking with dog    The 10-year ASCVD risk score (Sridhar NAVA, et al., 2019) is: 7.2%    Values used to calculate the score:      Age: 47 years      Sex: Male      Is Non- : No      Diabetic: No      Tobacco smoker: Yes      Systolic Blood Pressure: 832 mmHg      Is BP treated: No      HDL Cholesterol: 78 mg/dL      Total Cholesterol: 206 mg/dL    Pre-diabetes : managing with diet and lifestyle changes       BP Readings from Last 3 Encounters:   03/06/23 138/84   01/17/23 (!) 146/94   01/06/23 (!) 149/96     Current Outpatient Medications   Medication Sig Dispense Refill    budesonide-formoterol (SYMBICORT) 160-4.5 MCG/ACT AERO Inhale 2 puffs into the lungs 2 times daily 10.2 g 0    omeprazole (PRILOSEC) 20 MG delayed release capsule Take 20 mg by mouth daily Takes as needed      buPROPion (WELLBUTRIN XL) 150 MG extended release tablet Take 1 tablet by mouth every morning 30 tablet 3    albuterol sulfate HFA (VENTOLIN HFA) 108 (90 Base) MCG/ACT inhaler Inhale 2 puffs into the lungs 4 times daily as needed for Wheezing 18 g 2     No current facility-administered medications for this visit. Allergies   Allergen Reactions    Penicillins Rash       Past Medical & Surgical History:      Diagnosis Date    Obesity (BMI 35.0-39.9 without comorbidity)      Past Surgical History:   Procedure Laterality Date    STOMACH SURGERY         Family History:  No family history on file.     Social History:  Social History     Tobacco Use    Smoking status: Every Day     Packs/day: 0.25     Years: 40.00     Pack years: 10.00     Types: Cigarettes Smokeless tobacco: Never   Substance Use Topics    Alcohol use: Not on file     Comment: 6 pack a day         There is no immunization history on file for this patient. Review of Systems   Constitutional:  Negative for chills and fever. HENT:  Negative for congestion, sore throat and trouble swallowing. Respiratory:  Negative for cough. Cardiovascular:  Negative for chest pain and leg swelling. Gastrointestinal:  Negative for abdominal pain, constipation, diarrhea, nausea and vomiting. Genitourinary:  Negative for difficulty urinating. Musculoskeletal:  Negative for arthralgias and myalgias. Skin:  Negative for rash and wound. Neurological:  Negative for dizziness and headaches. Psychiatric/Behavioral:  Negative for agitation. VS:  /84   Pulse 72   Resp 18   Ht 6' 1\" (1.854 m)   Wt (!) 306 lb (138.8 kg)   SpO2 98%   BMI 40.37 kg/m²     Physical Exam  Constitutional:       Appearance: Normal appearance. HENT:      Head: Normocephalic. Nose: Nose normal. No rhinorrhea. Eyes:      General: No scleral icterus. Conjunctiva/sclera: Conjunctivae normal.   Cardiovascular:      Rate and Rhythm: Normal rate and regular rhythm. Pulses: Normal pulses. Heart sounds: Normal heart sounds. No murmur heard. Pulmonary:      Breath sounds: Normal breath sounds. No wheezing, rhonchi or rales. Abdominal:      General: Abdomen is flat. Bowel sounds are normal.   Musculoskeletal:      Right lower leg: No edema. Left lower leg: No edema. Skin:     General: Skin is warm. Findings: No rash. Neurological:      General: No focal deficit present. Mental Status: He is alert. Assessment/Plan:  1. Essential hypertension  Pt has lost lot of weight and would like to continue managing BP with meds and exercise  Pt will bring his BP numbers from home random checks   - LIPID PANEL; Future  - Hemoglobin A1C; Future  - Comprehensive Metabolic Panel; Future    2. Prediabetes   Continue diet and exercise    Pt has PFT scheduled for next week , given hx of smoking     Follow up:  3 mo     Patient agrees with the above stated plan. Carlene Wray received counseling on the following healthy behaviors: nutrition and exercise.     Allegra Moss MD  PGY-3 Family Medicine

## 2023-03-08 PROBLEM — R73.03 PREDIABETES: Status: ACTIVE | Noted: 2023-03-08

## 2023-03-08 ASSESSMENT — ENCOUNTER SYMPTOMS
CONSTIPATION: 0
ABDOMINAL PAIN: 0
SORE THROAT: 0
NAUSEA: 0
DIARRHEA: 0
VOMITING: 0
COUGH: 0
TROUBLE SWALLOWING: 0

## 2023-03-23 ENCOUNTER — HOSPITAL ENCOUNTER (OUTPATIENT)
Dept: PULMONOLOGY | Age: 53
Discharge: HOME | End: 2023-03-23
Payer: COMMERCIAL

## 2023-03-23 DIAGNOSIS — Z71.6 ENCOUNTER FOR SMOKING CESSATION COUNSELING: ICD-10-CM

## 2023-03-23 PROCEDURE — 94726 PLETHYSMOGRAPHY LUNG VOLUMES: CPT

## 2023-03-23 PROCEDURE — 94060 EVALUATION OF WHEEZING: CPT

## 2023-03-23 PROCEDURE — 94729 DIFFUSING CAPACITY: CPT

## 2023-03-24 NOTE — PROCEDURES
[id number] [address]                               PULMONARY FUNCTION    PATIENT NAME: Maya TATUM                       :        1970  MED REC NO:   [id number]                            ROOM:  ACCOUNT NO:   [de-identified]                           ADMIT DATE: 2023  PROVIDER:     Hira Peralta MD    DATE OF PROCEDURE:  2023    The patient showed excellent effort and cooperation for the test showing  moderate obstructive lung disease with no significant improvement with  bronchodilators and diffusion capacity is normal.  FVC is 4.05 liters,  72% of predicted and FEV1 of 2.78 liters, 64% of predicted. There is no  significant improvement with bronchodilators. The FEV1 3.05 liters with  a 9% change. DLCO is 128%.         Chaparro Salinas MD    D: 2023 15:28:15       T: 2023 15:59:24     [id number]  Job#: [id number]     Doc#: [id number]

## 2023-04-19 RX ORDER — BUDESONIDE AND FORMOTEROL FUMARATE DIHYDRATE 160; 4.5 UG/1; UG/1
2 AEROSOL RESPIRATORY (INHALATION) 2 TIMES DAILY
Qty: 10.2 G | Refills: 2 | Status: SHIPPED | OUTPATIENT
Start: 2023-04-19

## 2023-06-02 DIAGNOSIS — I10 ESSENTIAL HYPERTENSION: ICD-10-CM

## 2023-06-02 LAB
ALBUMIN SERPL-MCNC: 4.1 G/DL (ref 3.5–5.2)
ALP SERPL-CCNC: 61 U/L (ref 40–129)
ALT SERPL-CCNC: 25 U/L (ref 0–40)
ANION GAP SERPL CALCULATED.3IONS-SCNC: 17 MMOL/L (ref 7–16)
AST SERPL-CCNC: 29 U/L (ref 0–39)
BILIRUB SERPL-MCNC: 0.2 MG/DL (ref 0–1.2)
BUN SERPL-MCNC: 16 MG/DL (ref 6–20)
CALCIUM SERPL-MCNC: 9.5 MG/DL (ref 8.6–10.2)
CHLORIDE SERPL-SCNC: 103 MMOL/L (ref 98–107)
CHOLESTEROL, TOTAL: 196 MG/DL (ref 0–199)
CO2 SERPL-SCNC: 19 MMOL/L (ref 22–29)
CREAT SERPL-MCNC: 0.9 MG/DL (ref 0.7–1.2)
GLUCOSE SERPL-MCNC: 107 MG/DL (ref 74–99)
HBA1C MFR BLD: 5.7 % (ref 4–5.6)
HDLC SERPL-MCNC: 93 MG/DL
LDLC SERPL CALC-MCNC: 84 MG/DL (ref 0–99)
POTASSIUM SERPL-SCNC: 4.4 MMOL/L (ref 3.5–5)
PROT SERPL-MCNC: 7.4 G/DL (ref 6.4–8.3)
SODIUM SERPL-SCNC: 139 MMOL/L (ref 132–146)
TRIGL SERPL-MCNC: 96 MG/DL (ref 0–149)
VLDLC SERPL CALC-MCNC: 19 MG/DL

## 2023-06-06 ENCOUNTER — OFFICE VISIT (OUTPATIENT)
Dept: FAMILY MEDICINE CLINIC | Age: 53
End: 2023-06-06
Payer: COMMERCIAL

## 2023-06-06 VITALS
WEIGHT: 305 LBS | HEIGHT: 73 IN | HEART RATE: 72 BPM | DIASTOLIC BLOOD PRESSURE: 80 MMHG | SYSTOLIC BLOOD PRESSURE: 142 MMHG | BODY MASS INDEX: 40.42 KG/M2

## 2023-06-06 DIAGNOSIS — Z12.11 SCREENING FOR MALIGNANT NEOPLASM OF COLON: ICD-10-CM

## 2023-06-06 DIAGNOSIS — Z87.2 HISTORY OF PSORIASIS: ICD-10-CM

## 2023-06-06 DIAGNOSIS — R03.0 ELEVATED BP WITHOUT DIAGNOSIS OF HYPERTENSION: Primary | ICD-10-CM

## 2023-06-06 PROCEDURE — 3074F SYST BP LT 130 MM HG: CPT | Performed by: FAMILY MEDICINE

## 2023-06-06 PROCEDURE — G8427 DOCREV CUR MEDS BY ELIG CLIN: HCPCS | Performed by: FAMILY MEDICINE

## 2023-06-06 PROCEDURE — 99213 OFFICE O/P EST LOW 20 MIN: CPT | Performed by: FAMILY MEDICINE

## 2023-06-06 PROCEDURE — 3017F COLORECTAL CA SCREEN DOC REV: CPT | Performed by: FAMILY MEDICINE

## 2023-06-06 PROCEDURE — G8417 CALC BMI ABV UP PARAM F/U: HCPCS | Performed by: FAMILY MEDICINE

## 2023-06-06 PROCEDURE — 3078F DIAST BP <80 MM HG: CPT | Performed by: FAMILY MEDICINE

## 2023-06-06 PROCEDURE — 93784 AMBL BP MNTR W/SOFTWARE: CPT | Performed by: FAMILY MEDICINE

## 2023-06-06 PROCEDURE — 4004F PT TOBACCO SCREEN RCVD TLK: CPT | Performed by: FAMILY MEDICINE

## 2023-06-06 RX ORDER — BUDESONIDE AND FORMOTEROL FUMARATE DIHYDRATE 160; 4.5 UG/1; UG/1
2 AEROSOL RESPIRATORY (INHALATION) 2 TIMES DAILY
Qty: 10.2 G | Refills: 2 | Status: SHIPPED | OUTPATIENT
Start: 2023-06-06

## 2023-06-06 RX ORDER — ALBUTEROL SULFATE 90 UG/1
2 AEROSOL, METERED RESPIRATORY (INHALATION) 4 TIMES DAILY PRN
Qty: 18 G | Refills: 2 | Status: SHIPPED | OUTPATIENT
Start: 2023-06-06

## 2023-06-06 ASSESSMENT — ENCOUNTER SYMPTOMS
DIARRHEA: 0
SORE THROAT: 0
CONSTIPATION: 0
COUGH: 0
NAUSEA: 0
ABDOMINAL PAIN: 0
VOMITING: 0
TROUBLE SWALLOWING: 0

## 2023-06-06 NOTE — PROGRESS NOTES
[facility]  Precepting Note    Subjective:  Elevated BP, w/o Dx HTN  Walking more  Limiting salt intake. Daughter, nurse,  keeps checking BP at home and seeing 894H systolic. Continues having elevated readings in the office. Hctz tried in past and was getting hypotensive symptoms. Works at [employer]. Not acceptable. ROS otherwise negative     Past medical, surgical, family and social history were reviewed, non-contributory, and unchanged unless otherwise stated. Objective:    BP (!) 142/80   Pulse 72   Ht 6' 1\" (1.854 m)   Wt (!) 305 lb (138.3 kg)   BMI 40.24 kg/m²     Exam is as noted by resident with the following changes, additions or corrections:    General:  NAD; alert & oriented x 3   Heart:  RRR, no murmurs, gallops, or rubs. Lungs:  CTA bilaterally, no wheeze, rales or rhonchi  Abd: bowel sounds present, nontender, nondistended, no masses  Extrem:  No clubbing, cyanosis, or edema    Assessment/Plan:  HTN, possibly white coat. Ambulatory monitor and then decide on Tx. Attending Physician Statement  I have reviewed the chart, including any radiology or labs. I have discussed the case, including pertinent history and exam findings with the resident. I agree with the assessment, plan and orders as documented by the resident. Please refer to the resident note for additional information.       Electronically signed by Teodora Bauman MD on 6/6/2023 at 9:50 AM
file.    Social History:  Social History     Tobacco Use    Smoking status: Every Day     Packs/day: 0.25     Years: 40.00     Pack years: 10.00     Types: Cigarettes    Smokeless tobacco: Never   Substance Use Topics    Alcohol use: Not on file     Comment: 6 pack a day         There is no immunization history on file for this patient. Review of Systems   Constitutional:  Negative for chills and fever. HENT:  Negative for congestion, sore throat and trouble swallowing. Respiratory:  Negative for cough. Cardiovascular:  Negative for chest pain and leg swelling. Gastrointestinal:  Negative for abdominal pain, constipation, diarrhea, nausea and vomiting. Genitourinary:  Negative for difficulty urinating. Musculoskeletal:  Negative for arthralgias and myalgias. Skin:  Negative for rash and wound. Neurological:  Negative for dizziness and headaches. Psychiatric/Behavioral:  Negative for agitation. VS:  BP (!) 142/80   Pulse 72   Ht 6' 1\" (1.854 m)   Wt (!) 305 lb (138.3 kg)   BMI 40.24 kg/m²     Physical Exam  Constitutional:       Appearance: Normal appearance. HENT:      Head: Normocephalic. Nose: Nose normal. No rhinorrhea. Eyes:      General: No scleral icterus. Conjunctiva/sclera: Conjunctivae normal.   Cardiovascular:      Rate and Rhythm: Normal rate and regular rhythm. Pulses: Normal pulses. Heart sounds: Normal heart sounds. No murmur heard. Pulmonary:      Breath sounds: Normal breath sounds. No wheezing, rhonchi or rales. Abdominal:      General: Abdomen is flat. Bowel sounds are normal.   Musculoskeletal:      Right lower leg: No edema. Left lower leg: No edema. Skin:     General: Skin is warm. Findings: No rash. Neurological:      General: No focal deficit present. Mental Status: He is alert. Assessment/Plan:  1.  Essential hypertension  Pt would like to continue without meds and manage with diet mod and phy activity  Pt will

## 2023-06-09 ENCOUNTER — TELEPHONE (OUTPATIENT)
Dept: FAMILY MEDICINE CLINIC | Age: 53
End: 2023-06-09

## 2023-07-06 PROBLEM — Z12.11 SCREENING FOR MALIGNANT NEOPLASM OF COLON: Status: RESOLVED | Noted: 2023-06-06 | Resolved: 2023-07-06

## 2023-09-06 NOTE — PROGRESS NOTES
23  Lorenza Rojas : 1970 Sex: male  Age 46 y.o. Subjective:  Chief Complaint   Patient presents with    Hypertension    Rash     B/L lower arms    Congestion     & cough, times 5 days       HPI:   HPI    NTP    3 month follow up for HTN and pre-DM    Tried meds before but dt undesired effects, Dc'd them (Microzide, felt lightheaded, dizzy)  + lifestyle modifications  + diet (salt), exercise(dog walking)   Smoking hx - 40 pack year, quit?no, 1 pack every 4-5 days. Down from 1 ppd  Previously planned for ABPM for possible white coat syndrome  --> had done but unable to upload recordings  BP at home readings. Daughter (nurse) takes BP, says they're good but did not record them    Pre-DM  A1C [] 5.7  + lifestyle mods, diet, exercise  + today 5.6    SOB  Cough x 5days  SOB, wheezing  Symbicort BID  Had to use more Albuterol 2-4 x day       ROS:   Review of Systems   Constitutional:  Negative for chills and fever. HENT:  Negative for congestion, rhinorrhea and sore throat. Eyes:  Negative for pain and visual disturbance. Respiratory:  Positive for shortness of breath and wheezing. Cardiovascular:  Negative for chest pain and palpitations. Gastrointestinal:  Negative for diarrhea, nausea and vomiting. Genitourinary:  Negative for dysuria and hematuria. Musculoskeletal:  Negative for arthralgias and myalgias. Skin:  Negative for rash. Neurological:  Negative for dizziness and headaches. Unless otherwise stated in this report the patient's positive and negative responses for review of systems for constitutional, eyes, ENT, cardiovascular, respiratory, gastrointestinal, neurological, , musculoskeletal, and integument systems and related systems to the presenting problem are either stated in the history of present illness or were not pertinent or were negative for the symptoms and/or complaints related to the presenting medical problem.   Positives and pertinent negatives as per

## 2023-09-07 ENCOUNTER — OFFICE VISIT (OUTPATIENT)
Dept: FAMILY MEDICINE CLINIC | Age: 53
End: 2023-09-07
Payer: COMMERCIAL

## 2023-09-07 VITALS
WEIGHT: 297 LBS | HEIGHT: 73 IN | RESPIRATION RATE: 16 BRPM | SYSTOLIC BLOOD PRESSURE: 140 MMHG | TEMPERATURE: 98.1 F | HEART RATE: 89 BPM | OXYGEN SATURATION: 96 % | DIASTOLIC BLOOD PRESSURE: 89 MMHG | BODY MASS INDEX: 39.36 KG/M2

## 2023-09-07 DIAGNOSIS — K21.9 GASTROESOPHAGEAL REFLUX DISEASE WITHOUT ESOPHAGITIS: ICD-10-CM

## 2023-09-07 DIAGNOSIS — R73.03 PREDIABETES: ICD-10-CM

## 2023-09-07 DIAGNOSIS — Z71.6 ENCOUNTER FOR SMOKING CESSATION COUNSELING: ICD-10-CM

## 2023-09-07 DIAGNOSIS — J45.909 ASTHMA, UNSPECIFIED ASTHMA SEVERITY, UNSPECIFIED WHETHER COMPLICATED, UNSPECIFIED WHETHER PERSISTENT: Primary | ICD-10-CM

## 2023-09-07 DIAGNOSIS — I10 ESSENTIAL HYPERTENSION: ICD-10-CM

## 2023-09-07 LAB — HBA1C MFR BLD: 5.6 %

## 2023-09-07 PROCEDURE — 3078F DIAST BP <80 MM HG: CPT

## 2023-09-07 PROCEDURE — 99213 OFFICE O/P EST LOW 20 MIN: CPT

## 2023-09-07 PROCEDURE — 83036 HEMOGLOBIN GLYCOSYLATED A1C: CPT

## 2023-09-07 PROCEDURE — 3074F SYST BP LT 130 MM HG: CPT

## 2023-09-07 PROCEDURE — 4004F PT TOBACCO SCREEN RCVD TLK: CPT

## 2023-09-07 PROCEDURE — 3017F COLORECTAL CA SCREEN DOC REV: CPT

## 2023-09-07 PROCEDURE — G8417 CALC BMI ABV UP PARAM F/U: HCPCS

## 2023-09-07 PROCEDURE — G8427 DOCREV CUR MEDS BY ELIG CLIN: HCPCS

## 2023-09-07 RX ORDER — OMEPRAZOLE 20 MG/1
20 CAPSULE, DELAYED RELEASE ORAL DAILY
Qty: 30 CAPSULE | Refills: 2 | Status: CANCELLED | OUTPATIENT
Start: 2023-09-07

## 2023-09-07 RX ORDER — PREDNISONE 20 MG/1
20 TABLET ORAL 2 TIMES DAILY
Qty: 10 TABLET | Refills: 0 | Status: SHIPPED | OUTPATIENT
Start: 2023-09-07 | End: 2023-09-12

## 2023-09-07 RX ORDER — BUDESONIDE AND FORMOTEROL FUMARATE DIHYDRATE 160; 4.5 UG/1; UG/1
2 AEROSOL RESPIRATORY (INHALATION) 2 TIMES DAILY
Qty: 10.2 G | Refills: 2 | Status: SHIPPED | OUTPATIENT
Start: 2023-09-07

## 2023-09-07 RX ORDER — ALBUTEROL SULFATE 90 UG/1
2 AEROSOL, METERED RESPIRATORY (INHALATION) 4 TIMES DAILY PRN
Qty: 18 G | Refills: 2 | Status: SHIPPED | OUTPATIENT
Start: 2023-09-07

## 2023-09-07 RX ORDER — OMEPRAZOLE 20 MG/1
20 CAPSULE, DELAYED RELEASE ORAL DAILY
Qty: 30 CAPSULE | Refills: 3 | Status: SHIPPED | OUTPATIENT
Start: 2023-09-07

## 2023-09-07 ASSESSMENT — ENCOUNTER SYMPTOMS
NAUSEA: 0
DIARRHEA: 0
VOMITING: 0
RHINORRHEA: 0
SORE THROAT: 0
EYE PAIN: 0
WHEEZING: 1
SHORTNESS OF BREATH: 1

## 2023-11-27 DIAGNOSIS — J45.909 ASTHMA, UNSPECIFIED ASTHMA SEVERITY, UNSPECIFIED WHETHER COMPLICATED, UNSPECIFIED WHETHER PERSISTENT: ICD-10-CM

## 2023-11-27 RX ORDER — ALBUTEROL SULFATE 90 UG/1
2 AEROSOL, METERED RESPIRATORY (INHALATION) 4 TIMES DAILY PRN
Qty: 18 G | Refills: 2 | Status: SHIPPED | OUTPATIENT
Start: 2023-11-27

## 2023-11-27 RX ORDER — BUDESONIDE AND FORMOTEROL FUMARATE DIHYDRATE 160; 4.5 UG/1; UG/1
2 AEROSOL RESPIRATORY (INHALATION) 2 TIMES DAILY
Qty: 10.2 G | Refills: 2 | Status: SHIPPED | OUTPATIENT
Start: 2023-11-27

## 2023-12-04 ENCOUNTER — OFFICE VISIT (OUTPATIENT)
Dept: FAMILY MEDICINE CLINIC | Age: 53
End: 2023-12-04
Payer: COMMERCIAL

## 2023-12-04 VITALS
SYSTOLIC BLOOD PRESSURE: 138 MMHG | RESPIRATION RATE: 17 BRPM | TEMPERATURE: 97 F | BODY MASS INDEX: 39.45 KG/M2 | WEIGHT: 299 LBS | HEART RATE: 82 BPM | DIASTOLIC BLOOD PRESSURE: 88 MMHG | OXYGEN SATURATION: 97 %

## 2023-12-04 DIAGNOSIS — J45.909 ASTHMA, UNSPECIFIED ASTHMA SEVERITY, UNSPECIFIED WHETHER COMPLICATED, UNSPECIFIED WHETHER PERSISTENT: ICD-10-CM

## 2023-12-04 DIAGNOSIS — Z71.6 ENCOUNTER FOR SMOKING CESSATION COUNSELING: ICD-10-CM

## 2023-12-04 DIAGNOSIS — M25.531 RIGHT WRIST PAIN: Primary | ICD-10-CM

## 2023-12-04 DIAGNOSIS — I10 ESSENTIAL HYPERTENSION: ICD-10-CM

## 2023-12-04 LAB
ANION GAP SERPL CALCULATED.3IONS-SCNC: 17 MMOL/L (ref 7–16)
BUN BLDV-MCNC: 20 MG/DL (ref 6–20)
CALCIUM SERPL-MCNC: 9.3 MG/DL (ref 8.6–10.2)
CHLORIDE BLD-SCNC: 103 MMOL/L (ref 98–107)
CO2: 19 MMOL/L (ref 22–29)
CREAT SERPL-MCNC: 0.8 MG/DL (ref 0.7–1.2)
GFR SERPL CREATININE-BSD FRML MDRD: >60 ML/MIN/1.73M2
GLUCOSE BLD-MCNC: 123 MG/DL (ref 74–99)
POTASSIUM SERPL-SCNC: 5.1 MMOL/L (ref 3.5–5)
SODIUM BLD-SCNC: 139 MMOL/L (ref 132–146)

## 2023-12-04 PROCEDURE — G8417 CALC BMI ABV UP PARAM F/U: HCPCS

## 2023-12-04 PROCEDURE — 4004F PT TOBACCO SCREEN RCVD TLK: CPT

## 2023-12-04 PROCEDURE — 3078F DIAST BP <80 MM HG: CPT

## 2023-12-04 PROCEDURE — G8484 FLU IMMUNIZE NO ADMIN: HCPCS

## 2023-12-04 PROCEDURE — 3017F COLORECTAL CA SCREEN DOC REV: CPT

## 2023-12-04 PROCEDURE — 36415 COLL VENOUS BLD VENIPUNCTURE: CPT

## 2023-12-04 PROCEDURE — G8427 DOCREV CUR MEDS BY ELIG CLIN: HCPCS

## 2023-12-04 PROCEDURE — 99214 OFFICE O/P EST MOD 30 MIN: CPT

## 2023-12-04 PROCEDURE — 3074F SYST BP LT 130 MM HG: CPT

## 2023-12-10 ASSESSMENT — ENCOUNTER SYMPTOMS
ABDOMINAL PAIN: 0
CONSTIPATION: 0
VOMITING: 0
TROUBLE SWALLOWING: 0
WHEEZING: 0
NAUSEA: 0
DIARRHEA: 0
SHORTNESS OF BREATH: 0
COUGH: 0
COLOR CHANGE: 0
SORE THROAT: 0

## 2023-12-26 DIAGNOSIS — J45.909 ASTHMA, UNSPECIFIED ASTHMA SEVERITY, UNSPECIFIED WHETHER COMPLICATED, UNSPECIFIED WHETHER PERSISTENT: ICD-10-CM

## 2023-12-27 RX ORDER — BUDESONIDE AND FORMOTEROL FUMARATE DIHYDRATE 160; 4.5 UG/1; UG/1
AEROSOL RESPIRATORY (INHALATION)
Qty: 10.2 G | Refills: 2 | Status: SHIPPED | OUTPATIENT
Start: 2023-12-27

## 2024-01-09 ENCOUNTER — OFFICE VISIT (OUTPATIENT)
Dept: FAMILY MEDICINE CLINIC | Age: 54
End: 2024-01-09
Payer: COMMERCIAL

## 2024-01-09 VITALS
OXYGEN SATURATION: 96 % | SYSTOLIC BLOOD PRESSURE: 136 MMHG | BODY MASS INDEX: 40.37 KG/M2 | HEART RATE: 85 BPM | WEIGHT: 306 LBS | TEMPERATURE: 98.2 F | DIASTOLIC BLOOD PRESSURE: 78 MMHG | RESPIRATION RATE: 18 BRPM

## 2024-01-09 DIAGNOSIS — G56.01 CARPAL TUNNEL SYNDROME OF RIGHT WRIST: Primary | ICD-10-CM

## 2024-01-09 DIAGNOSIS — J44.9 CHRONIC OBSTRUCTIVE PULMONARY DISEASE, UNSPECIFIED COPD TYPE (HCC): ICD-10-CM

## 2024-01-09 DIAGNOSIS — M25.531 RIGHT WRIST PAIN: ICD-10-CM

## 2024-01-09 DIAGNOSIS — K21.9 GASTROESOPHAGEAL REFLUX DISEASE WITHOUT ESOPHAGITIS: ICD-10-CM

## 2024-01-09 PROCEDURE — 99213 OFFICE O/P EST LOW 20 MIN: CPT

## 2024-01-09 PROCEDURE — 4004F PT TOBACCO SCREEN RCVD TLK: CPT

## 2024-01-09 PROCEDURE — 3078F DIAST BP <80 MM HG: CPT

## 2024-01-09 PROCEDURE — 3023F SPIROM DOC REV: CPT

## 2024-01-09 PROCEDURE — 3075F SYST BP GE 130 - 139MM HG: CPT

## 2024-01-09 PROCEDURE — G8417 CALC BMI ABV UP PARAM F/U: HCPCS

## 2024-01-09 PROCEDURE — 3017F COLORECTAL CA SCREEN DOC REV: CPT

## 2024-01-09 PROCEDURE — G8484 FLU IMMUNIZE NO ADMIN: HCPCS

## 2024-01-09 PROCEDURE — G8427 DOCREV CUR MEDS BY ELIG CLIN: HCPCS

## 2024-01-09 RX ORDER — OMEPRAZOLE 20 MG/1
20 CAPSULE, DELAYED RELEASE ORAL DAILY
Qty: 30 CAPSULE | Refills: 3 | Status: SHIPPED | OUTPATIENT
Start: 2024-01-09

## 2024-01-09 RX ORDER — ALBUTEROL SULFATE 90 UG/1
2 AEROSOL, METERED RESPIRATORY (INHALATION) 4 TIMES DAILY PRN
Qty: 18 G | Refills: 2 | Status: SHIPPED | OUTPATIENT
Start: 2024-01-09

## 2024-01-09 RX ORDER — BUDESONIDE AND FORMOTEROL FUMARATE DIHYDRATE 160; 4.5 UG/1; UG/1
AEROSOL RESPIRATORY (INHALATION)
Qty: 10.2 G | Refills: 2 | Status: SHIPPED | OUTPATIENT
Start: 2024-01-09

## 2024-01-09 ASSESSMENT — PATIENT HEALTH QUESTIONNAIRE - PHQ9
1. LITTLE INTEREST OR PLEASURE IN DOING THINGS: 0
SUM OF ALL RESPONSES TO PHQ QUESTIONS 1-9: 0
SUM OF ALL RESPONSES TO PHQ9 QUESTIONS 1 & 2: 0
SUM OF ALL RESPONSES TO PHQ QUESTIONS 1-9: 0
2. FEELING DOWN, DEPRESSED OR HOPELESS: 0

## 2024-01-09 NOTE — PROGRESS NOTES
[facility]  Precepting Note    Subjective:  Right CTS  Wrist brace, stretches, voltaren gel.   Swelling improved.   Still with pain through palm and middle finger into wrist.   Works as contractor.   Flex and ext strength full.   Loss of sensation dorsal middle finger, red, lateral medial     Wheezing  Counseled on appropriate inhaler use.   Has been adhering.   Still wheezing on exam.     ROS otherwise negative     Past medical, surgical, family and social history were reviewed, non-contributory, and unchanged unless otherwise stated.    Objective:    /78   Pulse 85   Temp 98.2 °F (36.8 °C) (Temporal)   Resp 18   Wt (!) 138.8 kg (306 lb)   SpO2 96%   BMI 40.37 kg/m²     Exam is as noted by resident with the following changes, additions or corrections:    General:  NAD; alert & oriented x 3   Heart:  RRR, no murmurs, gallops, or rubs.  Lungs:  CTA bilaterally, no wheeze, rales or rhonchi  Abd: bowel sounds present, nontender, nondistended, no masses  Extrem:  No clubbing, cyanosis, or edema    Assessment/Plan:  Suspected CTS, right  Not improved with bracing and conservative measures.   EMG and wrist xray in prep for likely surgery.     Wheezing. With PFTs supportive of COPD.   Not bothered by it.   Smoking cessation encouraged.   Wouldn't add spiriva right now due to not bothering him   Continue ics/laba and michael       Attending Physician Statement  I have reviewed the chart, including any radiology or labs. I have discussed the case, including pertinent history and exam findings with the resident.  I agree with the assessment, plan and orders as documented by the resident.  Please refer to the resident note for additional information.      Electronically signed by EVERETT TOLENTINO MD on 1/9/2024 at 8:52 AM  
puffs into the lungs 4 times daily as needed for Wheezing 18 g 2    diclofenac sodium (VOLTAREN) 1 % GEL Apply 2 g topically 4 times daily 100 g 0    omeprazole (PRILOSEC) 20 MG delayed release capsule Take 1 capsule by mouth daily Takes as needed 30 capsule 3    budesonide-formoterol (SYMBICORT) 160-4.5 MCG/ACT AERO INHALE TWO PUFFS BY MOUTH INTO THE LUNGS TWO TIMES A DAY 10.2 g 2    Misc. Devices (WRIST BRACE) MISC Soft neutral position right wrist brace  Size to fit  Dx:  Wrist pain/carpal tunnel syndrome  Right and left 1 each 0    buPROPion (WELLBUTRIN XL) 150 MG extended release tablet Take 1 tablet by mouth every morning (Patient not taking: Reported on 9/7/2023) 30 tablet 3     No current facility-administered medications for this visit.        Return to Office: Return in about 2 months (around 3/9/2024).      This document may have been prepared at least partially through the use of voice recognition software. Although effort is taken to assure the accuracy of this document, it is possible that grammatical, syntax,  or spelling errors may occur.    Xochitl Howard MD  Family Medicine Resident PGY-3  1/10/2024

## 2024-01-10 ASSESSMENT — ENCOUNTER SYMPTOMS
SHORTNESS OF BREATH: 0
COLOR CHANGE: 0
COUGH: 0
SORE THROAT: 0
WHEEZING: 0
CONSTIPATION: 0
DIARRHEA: 0
VOMITING: 0
TROUBLE SWALLOWING: 0
NAUSEA: 0
ABDOMINAL PAIN: 0

## 2024-01-25 DIAGNOSIS — J44.9 CHRONIC OBSTRUCTIVE PULMONARY DISEASE, UNSPECIFIED COPD TYPE (HCC): ICD-10-CM

## 2024-01-25 DIAGNOSIS — M25.531 RIGHT WRIST PAIN: ICD-10-CM

## 2024-01-25 RX ORDER — BUDESONIDE AND FORMOTEROL FUMARATE DIHYDRATE 160; 4.5 UG/1; UG/1
AEROSOL RESPIRATORY (INHALATION)
Qty: 10.2 G | Refills: 2 | Status: SHIPPED | OUTPATIENT
Start: 2024-01-25

## 2024-01-25 NOTE — TELEPHONE ENCOUNTER
Last Appointment   1/9/2024  Next Appointment  3/12/2024  Needs refill of Symbicort sent in with [facility] per patient spouse will not cover the Generic.

## 2024-03-19 ENCOUNTER — OFFICE VISIT (OUTPATIENT)
Dept: FAMILY MEDICINE CLINIC | Age: 54
End: 2024-03-19
Payer: COMMERCIAL

## 2024-03-19 VITALS
OXYGEN SATURATION: 98 % | HEART RATE: 88 BPM | BODY MASS INDEX: 39.36 KG/M2 | RESPIRATION RATE: 18 BRPM | TEMPERATURE: 97 F | DIASTOLIC BLOOD PRESSURE: 90 MMHG | SYSTOLIC BLOOD PRESSURE: 138 MMHG | HEIGHT: 73 IN | WEIGHT: 297 LBS

## 2024-03-19 DIAGNOSIS — S66.312S: ICD-10-CM

## 2024-03-19 DIAGNOSIS — J44.9 CHRONIC OBSTRUCTIVE PULMONARY DISEASE, UNSPECIFIED COPD TYPE (HCC): ICD-10-CM

## 2024-03-19 DIAGNOSIS — M25.531 RIGHT WRIST PAIN: Primary | ICD-10-CM

## 2024-03-19 PROCEDURE — 3023F SPIROM DOC REV: CPT

## 2024-03-19 PROCEDURE — 3080F DIAST BP >= 90 MM HG: CPT

## 2024-03-19 PROCEDURE — G8417 CALC BMI ABV UP PARAM F/U: HCPCS

## 2024-03-19 PROCEDURE — G8427 DOCREV CUR MEDS BY ELIG CLIN: HCPCS

## 2024-03-19 PROCEDURE — 3075F SYST BP GE 130 - 139MM HG: CPT

## 2024-03-19 PROCEDURE — 99213 OFFICE O/P EST LOW 20 MIN: CPT

## 2024-03-19 PROCEDURE — 3017F COLORECTAL CA SCREEN DOC REV: CPT

## 2024-03-19 PROCEDURE — 4004F PT TOBACCO SCREEN RCVD TLK: CPT

## 2024-03-19 PROCEDURE — G8484 FLU IMMUNIZE NO ADMIN: HCPCS

## 2024-03-19 SDOH — ECONOMIC STABILITY: FOOD INSECURITY: WITHIN THE PAST 12 MONTHS, THE FOOD YOU BOUGHT JUST DIDN'T LAST AND YOU DIDN'T HAVE MONEY TO GET MORE.: NEVER TRUE

## 2024-03-19 SDOH — ECONOMIC STABILITY: FOOD INSECURITY: WITHIN THE PAST 12 MONTHS, YOU WORRIED THAT YOUR FOOD WOULD RUN OUT BEFORE YOU GOT MONEY TO BUY MORE.: NEVER TRUE

## 2024-03-19 SDOH — ECONOMIC STABILITY: INCOME INSECURITY: HOW HARD IS IT FOR YOU TO PAY FOR THE VERY BASICS LIKE FOOD, HOUSING, MEDICAL CARE, AND HEATING?: NOT HARD AT ALL

## 2024-03-19 NOTE — PROGRESS NOTES
S: 53 y.o. male with   Chief Complaint   Patient presents with    Finger Pain     Follow up    Health Maintenance     Due for colonoscopy       Finger pain located base of middle finger     O: VS:  height is 1.854 m (6' 0.99\") and weight is 134.7 kg (297 lb). His temporal temperature is 97 °F (36.1 °C). His blood pressure is 160/98 (abnormal) and his pulse is 88. His respiration is 18 and oxygen saturation is 98%.   BP Readings from Last 3 Encounters:   03/19/24 (!) 160/98   01/09/24 136/78   12/04/23 138/88     See resident note      Impression/Plan:   1) finger pain - likely soft tissue - consider hand surg referral for better dx.           Health Maintenance Due   Topic Date Due    Hepatitis B vaccine (1 of 3 - 3-dose series) Never done    COVID-19 Vaccine (1) Never done    Pneumococcal 0-64 years Vaccine (1 of 2 - PCV) Never done    HIV screen  Never done    Hepatitis C screen  Never done    DTaP/Tdap/Td vaccine (1 - Tdap) Never done    Colorectal Cancer Screen  Never done    Shingles vaccine (1 of 2) Never done    Flu vaccine (1) Never done         Attending Physician Statement  I have discussed the case, including pertinent history and exam findings with the resident. I also have seen the patient and performed key portions of the examination.  I agree with the documented assessment and plan.      Aiden Bettencourt MD  
Tenderness: There is no abdominal tenderness.   Musculoskeletal:      Right lower leg: No edema.      Left lower leg: No edema.      Comments: Right hand middle finger -minimal tenderness along MCP pain is reproducible on extension.   Skin:     General: Skin is warm and dry.   Neurological:      General: No focal deficit present.      Mental Status: He is alert and oriented to person, place, and time.           Current Outpatient Medications:     diclofenac sodium (VOLTAREN) 1 % GEL, Apply 2 g topically 4 times daily, Disp: 100 g, Rfl: 0    SYMBICORT 160-4.5 MCG/ACT AERO, INHALE TWO PUFFS BY MOUTH INTO THE LUNGS TWO TIMES A DAY, Disp: 10.2 g, Rfl: 2    albuterol sulfate HFA (VENTOLIN HFA) 108 (90 Base) MCG/ACT inhaler, Inhale 2 puffs into the lungs 4 times daily as needed for Wheezing, Disp: 18 g, Rfl: 2    omeprazole (PRILOSEC) 20 MG delayed release capsule, Take 1 capsule by mouth daily Takes as needed, Disp: 30 capsule, Rfl: 3    Misc. Devices (WRIST BRACE) MISC, Soft neutral position right wrist brace Size to fit Dx:  Wrist pain/carpal tunnel syndrome Right and left, Disp: 1 each, Rfl: 0    buPROPion (WELLBUTRIN XL) 150 MG extended release tablet, Take 1 tablet by mouth every morning (Patient not taking: Reported on 9/7/2023), Disp: 30 tablet, Rfl: 3    Assessment/Plan:   Diagnosis Orders   1. Right wrist pain  diclofenac sodium (VOLTAREN) 1 % GEL    External Referral To Orthopedic Surgery      2. Chronic obstructive pulmonary disease, unspecified COPD type (HCC)        3. Strain of extensor muscle, fascia and tendon of right middle finger at wrist and hand level, sequela  diclofenac sodium (VOLTAREN) 1 % GEL       53-year-old patient coming in for evaluation of right hand pain.  This is a sequelae visit.  More specifically pain is located in the right middle finger along MCP region.  On physical examination there is some tenderness to the dorsal aspect of the hand along the MCP.  Pain is reproducible with

## 2024-03-20 ASSESSMENT — ENCOUNTER SYMPTOMS
RHINORRHEA: 0
SHORTNESS OF BREATH: 0
DIARRHEA: 0
VOMITING: 0
WHEEZING: 0
NAUSEA: 0

## 2024-09-03 DIAGNOSIS — J44.9 CHRONIC OBSTRUCTIVE PULMONARY DISEASE, UNSPECIFIED COPD TYPE (HCC): ICD-10-CM

## 2024-09-03 NOTE — TELEPHONE ENCOUNTER
Last Appointment   3/19/2024  Next Appointment  9/6/2024    Patient's wife requesting refill of Symbicort. Thought his appointment was today, has been out for a week. Would like sent to [facility] in [place]

## 2024-09-04 RX ORDER — BUDESONIDE AND FORMOTEROL FUMARATE DIHYDRATE 160; 4.5 UG/1; UG/1
AEROSOL RESPIRATORY (INHALATION)
Qty: 10.2 G | Refills: 2 | Status: SHIPPED | OUTPATIENT
Start: 2024-09-04

## 2024-11-21 NOTE — PROGRESS NOTES
Subjective:  Larry Bains is a 54 y.o. male with chief complaint of Hypertension      HPI:  HPI    Annual visit    Right wrist pain  Chronic pain addressed in March  Concern for soft tissue injury  Referral to [facility] doctor  Inquiring about surgical intervention  Voltaren gel helps      Elevated BP   No hypertensive symptoms  Trying to work on weight  Wt Readings from Last 3 Encounters:   11/22/24 129.7 kg (286 lb)   03/19/24 134.7 kg (297 lb)   01/09/24 (!) 138.8 kg (306 lb)     Intentional weight loss with walking     Care gaps  CRC screen  Flu shot  A1C      ROS:  Review of Systems   Constitutional:  Negative for chills and fever.   HENT:  Negative for congestion, rhinorrhea and sore throat.    Eyes:  Negative for pain and visual disturbance.   Respiratory:  Negative for shortness of breath and wheezing.    Cardiovascular:  Negative for chest pain and palpitations.   Gastrointestinal:  Negative for diarrhea, nausea and vomiting.   Genitourinary:  Negative for dysuria and hematuria.   Musculoskeletal:  Negative for arthralgias and myalgias.   Skin:  Negative for rash.   Neurological:  Negative for dizziness and headaches.       Objective:  Vitals:    11/22/24 0823 11/22/24 0837   BP: (!) 171/92 (!) 150/85   Pulse: 95    Resp: 16    Temp: 97.9 °F (36.6 °C)    TempSrc: Temporal    SpO2: 96%    Weight: 129.7 kg (286 lb)    Height: 1.854 m (6' 0.99\")      Physical Exam  Vitals and nursing note reviewed.   Constitutional:       General: He is not in acute distress.     Appearance: Normal appearance.   HENT:      Head: Normocephalic and atraumatic.   Eyes:      General:         Right eye: No discharge.         Left eye: No discharge.   Cardiovascular:      Rate and Rhythm: Normal rate and regular rhythm.      Heart sounds: No murmur heard.  Pulmonary:      Effort: Pulmonary effort is normal.      Breath sounds: No wheezing.   Abdominal:      General: Bowel sounds are normal.      Palpations: Abdomen is soft.

## 2024-11-22 ENCOUNTER — OFFICE VISIT (OUTPATIENT)
Dept: FAMILY MEDICINE CLINIC | Age: 54
End: 2024-11-22

## 2024-11-22 VITALS
HEART RATE: 95 BPM | BODY MASS INDEX: 37.91 KG/M2 | DIASTOLIC BLOOD PRESSURE: 85 MMHG | SYSTOLIC BLOOD PRESSURE: 150 MMHG | WEIGHT: 286 LBS | RESPIRATION RATE: 16 BRPM | TEMPERATURE: 97.9 F | OXYGEN SATURATION: 96 % | HEIGHT: 73 IN

## 2024-11-22 DIAGNOSIS — M25.531 RIGHT WRIST PAIN: ICD-10-CM

## 2024-11-22 DIAGNOSIS — R73.03 PREDIABETES: ICD-10-CM

## 2024-11-22 DIAGNOSIS — I10 HYPERTENSION, UNSPECIFIED TYPE: Primary | ICD-10-CM

## 2024-11-22 DIAGNOSIS — J44.9 CHRONIC OBSTRUCTIVE PULMONARY DISEASE, UNSPECIFIED COPD TYPE (HCC): ICD-10-CM

## 2024-11-22 DIAGNOSIS — S66.312S: ICD-10-CM

## 2024-11-22 DIAGNOSIS — K21.9 GASTROESOPHAGEAL REFLUX DISEASE WITHOUT ESOPHAGITIS: ICD-10-CM

## 2024-11-22 LAB
ALBUMIN: 4 G/DL (ref 3.5–5.2)
ALP BLD-CCNC: 67 U/L (ref 40–129)
ALT SERPL-CCNC: 23 U/L (ref 0–40)
ANION GAP SERPL CALCULATED.3IONS-SCNC: 10 MMOL/L (ref 7–16)
AST SERPL-CCNC: 30 U/L (ref 0–39)
BASOPHILS ABSOLUTE: 0.04 K/UL (ref 0–0.2)
BASOPHILS RELATIVE PERCENT: 1 % (ref 0–2)
BILIRUB SERPL-MCNC: 0.2 MG/DL (ref 0–1.2)
BUN BLDV-MCNC: 19 MG/DL (ref 6–20)
CALCIUM SERPL-MCNC: 9.1 MG/DL (ref 8.6–10.2)
CHLORIDE BLD-SCNC: 99 MMOL/L (ref 98–107)
CO2: 26 MMOL/L (ref 22–29)
CREAT SERPL-MCNC: 0.8 MG/DL (ref 0.7–1.2)
EOSINOPHILS ABSOLUTE: 0.18 K/UL (ref 0.05–0.5)
EOSINOPHILS RELATIVE PERCENT: 3 % (ref 0–6)
GFR, ESTIMATED: >90 ML/MIN/1.73M2
GLUCOSE BLD-MCNC: 140 MG/DL (ref 74–99)
HBA1C MFR BLD: 5.6 % (ref 4–5.6)
HCT VFR BLD CALC: 41.2 % (ref 37–54)
HEMOGLOBIN: 13.4 G/DL (ref 12.5–16.5)
IMMATURE GRANULOCYTES %: 0 % (ref 0–5)
IMMATURE GRANULOCYTES ABSOLUTE: <0.03 K/UL (ref 0–0.58)
LYMPHOCYTES ABSOLUTE: 1.07 K/UL (ref 1.5–4)
LYMPHOCYTES RELATIVE PERCENT: 15 % (ref 20–42)
MCH RBC QN AUTO: 32.7 PG (ref 26–35)
MCHC RBC AUTO-ENTMCNC: 32.5 G/DL (ref 32–34.5)
MCV RBC AUTO: 100.5 FL (ref 80–99.9)
MONOCYTES ABSOLUTE: 0.84 K/UL (ref 0.1–0.95)
MONOCYTES RELATIVE PERCENT: 12 % (ref 2–12)
NEUTROPHILS ABSOLUTE: 5.13 K/UL (ref 1.8–7.3)
NEUTROPHILS RELATIVE PERCENT: 71 % (ref 43–80)
PDW BLD-RTO: 12.9 % (ref 11.5–15)
PLATELET # BLD: 177 K/UL (ref 130–450)
PMV BLD AUTO: 11.4 FL (ref 7–12)
POTASSIUM SERPL-SCNC: 4.3 MMOL/L (ref 3.5–5)
RBC # BLD: 4.1 M/UL (ref 3.8–5.8)
SODIUM BLD-SCNC: 135 MMOL/L (ref 132–146)
TOTAL PROTEIN: 6.9 G/DL (ref 6.4–8.3)
WBC # BLD: 7.3 K/UL (ref 4.5–11.5)

## 2024-11-22 RX ORDER — BUDESONIDE AND FORMOTEROL FUMARATE DIHYDRATE 160; 4.5 UG/1; UG/1
AEROSOL RESPIRATORY (INHALATION)
Qty: 10.2 G | Refills: 2 | Status: SHIPPED | OUTPATIENT
Start: 2024-11-22

## 2024-11-22 RX ORDER — AMLODIPINE BESYLATE 5 MG/1
5 TABLET ORAL DAILY
Qty: 30 TABLET | Refills: 1 | Status: SHIPPED | OUTPATIENT
Start: 2024-11-22

## 2024-11-22 RX ORDER — ALBUTEROL SULFATE 90 UG/1
2 INHALANT RESPIRATORY (INHALATION) 4 TIMES DAILY PRN
Qty: 18 G | Refills: 2 | Status: SHIPPED | OUTPATIENT
Start: 2024-11-22

## 2024-11-22 NOTE — PROGRESS NOTES
[facility]  Precepting Note    Subjective:  Hypertension  Has been working on diet and weight loss  Remains significantly elevated  No medication yet  Down 20 lbs since trying to work on it    R wrist pain  Chronic  Went to [facility] hand  He reports he may need surgery  Using Voltaren gel    ROS otherwise negative    Past medical, surgical, family and social history were reviewed, non-contributory, and unchanged unless otherwise stated.    Objective:    BP (!) 150/85   Pulse 95   Temp 97.9 °F (36.6 °C) (Temporal)   Resp 16   Ht 1.854 m (6' 0.99\")   Wt 129.7 kg (286 lb)   SpO2 96%   BMI 37.74 kg/m²     Exam is as noted by resident with the following changes, additions or corrections:        Assessment/Plan:    R wrist pain - continue f/u with hand surgery and voltaren  Hypertension - start Amlodipine 5 mg labs, f/u in 4-6 weeks  Declines HM updates     Attending Physician Statement  I have reviewed the chart, including any radiology or labs.  I agree with the assessment, plan and orders as documented by the resident.  Please refer to the resident note for additional information.      Electronically signed by DANA CHAMBERS MD on 11/22/2024 at 8:42 AM

## 2025-03-27 DIAGNOSIS — J44.9 CHRONIC OBSTRUCTIVE PULMONARY DISEASE, UNSPECIFIED COPD TYPE (HCC): ICD-10-CM

## 2025-03-27 RX ORDER — BUDESONIDE AND FORMOTEROL FUMARATE DIHYDRATE 160; 4.5 UG/1; UG/1
AEROSOL RESPIRATORY (INHALATION)
Qty: 10.2 G | Refills: 2 | Status: SHIPPED | OUTPATIENT
Start: 2025-03-27

## 2025-03-27 NOTE — TELEPHONE ENCOUNTER
Last Appointment   11/22/2024  Next Appointment  4/1/2025    Wife calling requesting a refill of Symbicort. Would like this sent to [facility] in [place].

## 2025-03-27 NOTE — TELEPHONE ENCOUNTER
Name of Medication(s) Requested:  Requested Prescriptions     Pending Prescriptions Disp Refills    SYMBICORT 160-4.5 MCG/ACT AERO 10.2 g 2     Sig: INHALE TWO PUFFS BY MOUTH INTO THE LUNGS TWO TIMES A DAY       Medication is on current medication list Yes    Dosage and directions were verified? Yes    Quantity verified: 90 day supply     Pharmacy Verified?  Yes    Last Appointment:  11/22/2024    Future appts:  Future Appointments   Date Time Provider Department Center   4/1/2025  2:30 PM Enrrique Farr MD [facility]        (If no appt send self scheduling link. .REFILLAPPT)  Scheduling request sent?     [] Yes  [x] No    Does patient need updated?  [] Yes  [x] No

## 2025-04-01 ENCOUNTER — OFFICE VISIT (OUTPATIENT)
Dept: FAMILY MEDICINE CLINIC | Age: 55
End: 2025-04-01
Payer: COMMERCIAL

## 2025-04-01 VITALS
RESPIRATION RATE: 18 BRPM | TEMPERATURE: 98.2 F | HEIGHT: 73 IN | BODY MASS INDEX: 37.64 KG/M2 | DIASTOLIC BLOOD PRESSURE: 79 MMHG | SYSTOLIC BLOOD PRESSURE: 149 MMHG | WEIGHT: 284 LBS | OXYGEN SATURATION: 98 % | HEART RATE: 92 BPM

## 2025-04-01 DIAGNOSIS — K21.9 GASTROESOPHAGEAL REFLUX DISEASE WITHOUT ESOPHAGITIS: ICD-10-CM

## 2025-04-01 DIAGNOSIS — M25.531 RIGHT WRIST PAIN: ICD-10-CM

## 2025-04-01 DIAGNOSIS — J44.9 CHRONIC OBSTRUCTIVE PULMONARY DISEASE, UNSPECIFIED COPD TYPE (HCC): ICD-10-CM

## 2025-04-01 DIAGNOSIS — S66.312S: ICD-10-CM

## 2025-04-01 DIAGNOSIS — I10 HYPERTENSION, UNSPECIFIED TYPE: ICD-10-CM

## 2025-04-01 PROCEDURE — 3077F SYST BP >= 140 MM HG: CPT

## 2025-04-01 PROCEDURE — 4004F PT TOBACCO SCREEN RCVD TLK: CPT | Performed by: FAMILY MEDICINE

## 2025-04-01 PROCEDURE — 3023F SPIROM DOC REV: CPT | Performed by: FAMILY MEDICINE

## 2025-04-01 PROCEDURE — 3017F COLORECTAL CA SCREEN DOC REV: CPT | Performed by: FAMILY MEDICINE

## 2025-04-01 PROCEDURE — G8427 DOCREV CUR MEDS BY ELIG CLIN: HCPCS | Performed by: FAMILY MEDICINE

## 2025-04-01 PROCEDURE — 99213 OFFICE O/P EST LOW 20 MIN: CPT

## 2025-04-01 PROCEDURE — G8417 CALC BMI ABV UP PARAM F/U: HCPCS | Performed by: FAMILY MEDICINE

## 2025-04-01 PROCEDURE — 3078F DIAST BP <80 MM HG: CPT

## 2025-04-01 RX ORDER — ALBUTEROL SULFATE 90 UG/1
2 INHALANT RESPIRATORY (INHALATION) 4 TIMES DAILY PRN
Qty: 18 G | Refills: 2 | Status: SHIPPED | OUTPATIENT
Start: 2025-04-01

## 2025-04-01 RX ORDER — AMLODIPINE BESYLATE 5 MG/1
5 TABLET ORAL DAILY
Qty: 30 TABLET | Refills: 1 | Status: SHIPPED | OUTPATIENT
Start: 2025-04-01

## 2025-04-01 RX ORDER — OMEPRAZOLE 20 MG/1
20 CAPSULE, DELAYED RELEASE ORAL DAILY
Qty: 30 CAPSULE | Refills: 3 | Status: SHIPPED | OUTPATIENT
Start: 2025-04-01

## 2025-04-01 SDOH — ECONOMIC STABILITY: FOOD INSECURITY: WITHIN THE PAST 12 MONTHS, THE FOOD YOU BOUGHT JUST DIDN'T LAST AND YOU DIDN'T HAVE MONEY TO GET MORE.: NEVER TRUE

## 2025-04-01 SDOH — ECONOMIC STABILITY: FOOD INSECURITY: WITHIN THE PAST 12 MONTHS, YOU WORRIED THAT YOUR FOOD WOULD RUN OUT BEFORE YOU GOT MONEY TO BUY MORE.: NEVER TRUE

## 2025-04-01 ASSESSMENT — PATIENT HEALTH QUESTIONNAIRE - PHQ9
SUM OF ALL RESPONSES TO PHQ QUESTIONS 1-9: 0
1. LITTLE INTEREST OR PLEASURE IN DOING THINGS: NOT AT ALL
SUM OF ALL RESPONSES TO PHQ QUESTIONS 1-9: 0
2. FEELING DOWN, DEPRESSED OR HOPELESS: NOT AT ALL
SUM OF ALL RESPONSES TO PHQ QUESTIONS 1-9: 0
SUM OF ALL RESPONSES TO PHQ QUESTIONS 1-9: 0

## 2025-04-01 ASSESSMENT — ENCOUNTER SYMPTOMS
WHEEZING: 1
SHORTNESS OF BREATH: 0
VOMITING: 0
RHINORRHEA: 0
COUGH: 1
NAUSEA: 0
SORE THROAT: 0
EYE PAIN: 0
DIARRHEA: 0

## 2025-04-01 NOTE — PROGRESS NOTES
S: 54 y.o. male with   Chief Complaint   Patient presents with    Hypertension    Other     Discuss Cologuard        55 yo male here for FU of HBP (Dx in Nov 2024) & COPD.  Tolerated amlodipine 5 mg without problem until he ran out of it - has been out   of x 4 months.  Still smoking, but decreased amt per day.    O: VS:  height is 1.854 m (6' 1\") and weight is 128.8 kg (284 lb). His temporal temperature is 98.2 °F (36.8 °C). His blood pressure is 149/79 (abnormal) and his pulse is 92. His respiration is 18 and oxygen saturation is 98%.   BP Readings from Last 3 Encounters:   04/01/25 (!) 149/79   11/22/24 (!) 150/85   03/19/24 (!) 138/90     See resident note      Impression/Plan:   1) HBP   Restart amlodipine  2) COPD   Continue symbicort  3) Refuses Prevnar,etc.  4) FU 1 month      Health Maintenance Due   Topic Date Due    HIV screen  Never done    Hepatitis C screen  Never done    Hepatitis B vaccine (1 of 3 - 19+ 3-dose series) Never done    DTaP/Tdap/Td vaccine (1 - Tdap) Never done    Pneumococcal 50+ years Vaccine (1 of 2 - PCV) Never done    Colorectal Cancer Screen  Never done    Shingles vaccine (1 of 2) Never done    COVID-19 Vaccine (1 - 2024-25 season) Never done    Depression Screen  01/09/2025         Attending Physician Statement  I have discussed the case, including pertinent history and exam findings with the resident. I agree with the documented assessment and plan.      Jordy Matthew MD

## 2025-04-01 NOTE — PROGRESS NOTES
Subjective:  Larry Bains is a 54 y.o. male with chief complaint of Hypertension and Other (Discuss Cologuard )      HPI:  HPI    Hypertension  Follow-up  Blood pressure is not controlled today.  BP Readings from Last 3 Encounters:   04/01/25 (!) 149/79   11/22/24 (!) 150/85   03/19/24 (!) 138/90     Patient continues medications regularly. Compliance is good.  Denies CP, sob, abd pain, headaches, vision changes, dizziness, hypotensive symptoms.   No side effects from medications noted.  Missed amlodipine since December      COPD  On symbicort  Down to 1 pack per week    Plaster, traveling    ROS:  Review of Systems   Constitutional:  Negative for chills and fever.   HENT:  Negative for congestion, rhinorrhea and sore throat.    Eyes:  Negative for pain and visual disturbance.   Respiratory:  Positive for cough and wheezing. Negative for shortness of breath.    Cardiovascular:  Negative for chest pain and palpitations.   Gastrointestinal:  Negative for diarrhea, nausea and vomiting.   Genitourinary:  Negative for dysuria and hematuria.   Musculoskeletal:  Negative for arthralgias and myalgias.   Skin:  Negative for rash.   Neurological:  Negative for dizziness and headaches.       Objective:  Vitals:    04/01/25 1436 04/01/25 1440   BP: (!) 149/88 (!) 149/79   Pulse: 92    Resp: 18    Temp: 98.2 °F (36.8 °C)    TempSrc: Temporal    SpO2: 98%    Weight: 128.8 kg (284 lb)    Height: 1.854 m (6' 1\")      Physical Exam  Vitals and nursing note reviewed.   Constitutional:       General: He is not in acute distress.     Appearance: Normal appearance.   HENT:      Head: Normocephalic and atraumatic.   Eyes:      General:         Right eye: No discharge.         Left eye: No discharge.   Cardiovascular:      Rate and Rhythm: Normal rate and regular rhythm.      Heart sounds: No murmur heard.  Pulmonary:      Effort: Pulmonary effort is normal.      Breath sounds: No wheezing.   Abdominal:      General: Bowel sounds are

## 2025-05-01 ENCOUNTER — OFFICE VISIT (OUTPATIENT)
Dept: FAMILY MEDICINE CLINIC | Age: 55
End: 2025-05-01
Payer: COMMERCIAL

## 2025-05-01 VITALS
TEMPERATURE: 98.6 F | BODY MASS INDEX: 37.07 KG/M2 | WEIGHT: 281 LBS | HEART RATE: 89 BPM | RESPIRATION RATE: 18 BRPM | SYSTOLIC BLOOD PRESSURE: 134 MMHG | DIASTOLIC BLOOD PRESSURE: 80 MMHG | OXYGEN SATURATION: 97 %

## 2025-05-01 DIAGNOSIS — M25.531 RIGHT WRIST PAIN: ICD-10-CM

## 2025-05-01 DIAGNOSIS — I10 HYPERTENSION, UNSPECIFIED TYPE: Primary | ICD-10-CM

## 2025-05-01 DIAGNOSIS — S66.312S: ICD-10-CM

## 2025-05-01 DIAGNOSIS — J44.9 CHRONIC OBSTRUCTIVE PULMONARY DISEASE, UNSPECIFIED COPD TYPE (HCC): ICD-10-CM

## 2025-05-01 PROCEDURE — G8417 CALC BMI ABV UP PARAM F/U: HCPCS | Performed by: STUDENT IN AN ORGANIZED HEALTH CARE EDUCATION/TRAINING PROGRAM

## 2025-05-01 PROCEDURE — 3075F SYST BP GE 130 - 139MM HG: CPT

## 2025-05-01 PROCEDURE — 3079F DIAST BP 80-89 MM HG: CPT

## 2025-05-01 PROCEDURE — 3023F SPIROM DOC REV: CPT | Performed by: STUDENT IN AN ORGANIZED HEALTH CARE EDUCATION/TRAINING PROGRAM

## 2025-05-01 PROCEDURE — G8427 DOCREV CUR MEDS BY ELIG CLIN: HCPCS | Performed by: STUDENT IN AN ORGANIZED HEALTH CARE EDUCATION/TRAINING PROGRAM

## 2025-05-01 PROCEDURE — 4004F PT TOBACCO SCREEN RCVD TLK: CPT | Performed by: STUDENT IN AN ORGANIZED HEALTH CARE EDUCATION/TRAINING PROGRAM

## 2025-05-01 PROCEDURE — 3017F COLORECTAL CA SCREEN DOC REV: CPT | Performed by: STUDENT IN AN ORGANIZED HEALTH CARE EDUCATION/TRAINING PROGRAM

## 2025-05-01 PROCEDURE — 99213 OFFICE O/P EST LOW 20 MIN: CPT

## 2025-05-01 ASSESSMENT — ENCOUNTER SYMPTOMS
EYE PAIN: 0
RHINORRHEA: 0
COUGH: 1
SORE THROAT: 0
WHEEZING: 0
NAUSEA: 0
DIARRHEA: 0
VOMITING: 0

## 2025-05-01 NOTE — PROGRESS NOTES
[facility]  Precepting Note    Subjective:  Htn and COPD    HTN controlled  BP Readings from Last 3 Encounters:   05/01/25 134/80   04/01/25 (!) 149/79   11/22/24 (!) 150/85       COPD-still needing rescut more than ideal. Also on symbicort    ROS otherwise negative     Past medical, surgical, family and social history were reviewed, non-contributory, and unchanged unless otherwise stated.    Objective:    /80   Pulse 89   Temp 98.6 °F (37 °C) (Temporal)   Resp 18   Wt 127.5 kg (281 lb)   SpO2 97%   BMI 37.07 kg/m²     Exam is as noted by resident with the following changes, additions or corrections:    General:  NAD; alert & oriented x 3   Heart:  RRR, no murmurs, gallops, or rubs.  Lungs:  +wheeze, no rales or rhonchi  Abd: bowel sounds present, nontender, nondistended, no masses  Extrem:  No clubbing, cyanosis, or edema    Assessment/Plan:  COPD-switch symbicort to spiriva since PFTs have confirmed COPD  HTN-no change to regimen  Return 1 month for COPD follow up     Attending Physician Statement  I have reviewed the chart, including any radiology or labs. I have discussed the case, including pertinent history and exam findings with the resident. IS saw and examined the patient.  I agree with the assessment, plan and orders as documented by the resident.  Please refer to the resident note for additional information.      Electronically signed by Joseph Jose MD on 5/1/2025 at 10:04 AM    
     General: Bowel sounds are normal.      Palpations: Abdomen is soft.      Tenderness: There is no abdominal tenderness.   Musculoskeletal:      Right lower leg: No edema.      Left lower leg: No edema.   Skin:     General: Skin is warm and dry.   Neurological:      General: No focal deficit present.      Mental Status: He is alert and oriented to person, place, and time.           Current Outpatient Medications:     diclofenac sodium (VOLTAREN) 1 % GEL, Apply 2 g topically 4 times daily, Disp: 100 g, Rfl: 0    tiotropium (SPIRIVA RESPIMAT) 2.5 MCG/ACT AERS inhaler, Inhale 2 puffs into the lungs daily, Disp: 4 g, Rfl: 0    amLODIPine (NORVASC) 5 MG tablet, Take 1 tablet by mouth daily, Disp: 30 tablet, Rfl: 1    albuterol sulfate HFA (VENTOLIN HFA) 108 (90 Base) MCG/ACT inhaler, Inhale 2 puffs into the lungs 4 times daily as needed for Wheezing, Disp: 18 g, Rfl: 2    omeprazole (PRILOSEC) 20 MG delayed release capsule, Take 1 capsule by mouth daily Takes as needed, Disp: 30 capsule, Rfl: 3    SYMBICORT 160-4.5 MCG/ACT AERO, INHALE TWO PUFFS BY MOUTH INTO THE LUNGS TWO TIMES A DAY, Disp: 10.2 g, Rfl: 2    Misc. Devices (WRIST BRACE) MISC, Soft neutral position right wrist brace Size to fit Dx:  Wrist pain/carpal tunnel syndrome Right and left (Patient not taking: Reported on 4/1/2025), Disp: 1 each, Rfl: 0    Assessment:  1. Hypertension, unspecified type    2. Chronic obstructive pulmonary disease, unspecified COPD type (HCC)    3. Right wrist pain    4. Strain of extensor muscle, fascia and tendon of right middle finger at wrist and hand level, sequela         Plan:   Diagnosis Orders   1. Hypertension, unspecified type       Stable.  Continue antihypertensive regimen.  RTC in 3 months     2. Chronic obstructive pulmonary disease, unspecified COPD type (HCC)  tiotropium (SPIRIVA RESPIMAT) 2.5 MCG/ACT AERS inhaler     Previous PFTs confirming COPD.  Discussed changing of medications for better symptom

## 2025-05-14 DIAGNOSIS — J44.9 CHRONIC OBSTRUCTIVE PULMONARY DISEASE, UNSPECIFIED COPD TYPE (HCC): ICD-10-CM

## 2025-05-14 NOTE — TELEPHONE ENCOUNTER
Last Appointment   5/1/2025  Next Appointment  6/3/2025    Patients wife requesting refill of Spiriva. She states that he has been taking 2 puffs TWICE daily instead of once daily. He is now out and they would like a refill sent to the pharmacy. Wife was told that this may not be covered as he should not need a refill until the beginning of June.

## 2025-07-03 ENCOUNTER — TELEPHONE (OUTPATIENT)
Dept: FAMILY MEDICINE CLINIC | Age: 55
End: 2025-07-03

## 2025-07-03 NOTE — TELEPHONE ENCOUNTER
Last Appointment   5/1/2025  Next Appointment  7/31/2025    Patients wife requesting refills of     Spiriva  Voltaren Gel  Amlodipine  Albuterol Inhaler  Omeprazole    Would like these sent to [facility] in [place].

## 2025-07-05 DIAGNOSIS — I10 HYPERTENSION, UNSPECIFIED TYPE: ICD-10-CM

## 2025-07-05 DIAGNOSIS — S66.312S: ICD-10-CM

## 2025-07-05 DIAGNOSIS — J44.9 CHRONIC OBSTRUCTIVE PULMONARY DISEASE, UNSPECIFIED COPD TYPE (HCC): ICD-10-CM

## 2025-07-05 DIAGNOSIS — M25.531 RIGHT WRIST PAIN: ICD-10-CM

## 2025-07-07 RX ORDER — DICLOFENAC SODIUM 10 MG/G
GEL TOPICAL
Qty: 100 G | Refills: 0 | Status: SHIPPED | OUTPATIENT
Start: 2025-07-07 | End: 2025-08-13 | Stop reason: SDUPTHER

## 2025-07-07 RX ORDER — AMLODIPINE BESYLATE 5 MG/1
5 TABLET ORAL DAILY
Qty: 30 TABLET | Refills: 1 | Status: SHIPPED | OUTPATIENT
Start: 2025-07-07

## 2025-07-07 RX ORDER — TIOTROPIUM BROMIDE INHALATION SPRAY 3.12 UG/1
SPRAY, METERED RESPIRATORY (INHALATION)
Qty: 4 G | Refills: 0 | Status: SHIPPED | OUTPATIENT
Start: 2025-07-07 | End: 2025-08-13 | Stop reason: SDUPTHER

## 2025-07-07 RX ORDER — ALBUTEROL SULFATE 90 UG/1
INHALANT RESPIRATORY (INHALATION)
Qty: 1 EACH | Refills: 2 | Status: SHIPPED | OUTPATIENT
Start: 2025-07-07

## 2025-07-30 SDOH — HEALTH STABILITY: PHYSICAL HEALTH: ON AVERAGE, HOW MANY MINUTES DO YOU ENGAGE IN EXERCISE AT THIS LEVEL?: PATIENT DECLINED

## 2025-07-30 SDOH — HEALTH STABILITY: PHYSICAL HEALTH
ON AVERAGE, HOW MANY DAYS PER WEEK DO YOU ENGAGE IN MODERATE TO STRENUOUS EXERCISE (LIKE A BRISK WALK)?: PATIENT DECLINED

## 2025-07-31 ENCOUNTER — OFFICE VISIT (OUTPATIENT)
Dept: FAMILY MEDICINE CLINIC | Age: 55
End: 2025-07-31
Payer: COMMERCIAL

## 2025-07-31 VITALS
SYSTOLIC BLOOD PRESSURE: 148 MMHG | HEART RATE: 84 BPM | DIASTOLIC BLOOD PRESSURE: 80 MMHG | OXYGEN SATURATION: 96 % | BODY MASS INDEX: 38.46 KG/M2 | TEMPERATURE: 97.8 F | RESPIRATION RATE: 16 BRPM | WEIGHT: 290.2 LBS | HEIGHT: 73 IN

## 2025-07-31 DIAGNOSIS — I10 PRIMARY HYPERTENSION: Primary | ICD-10-CM

## 2025-07-31 PROCEDURE — 4004F PT TOBACCO SCREEN RCVD TLK: CPT

## 2025-07-31 PROCEDURE — 3017F COLORECTAL CA SCREEN DOC REV: CPT

## 2025-07-31 PROCEDURE — 3077F SYST BP >= 140 MM HG: CPT

## 2025-07-31 PROCEDURE — G8417 CALC BMI ABV UP PARAM F/U: HCPCS

## 2025-07-31 PROCEDURE — 99213 OFFICE O/P EST LOW 20 MIN: CPT

## 2025-07-31 PROCEDURE — 3078F DIAST BP <80 MM HG: CPT

## 2025-07-31 PROCEDURE — G8427 DOCREV CUR MEDS BY ELIG CLIN: HCPCS

## 2025-07-31 ASSESSMENT — PATIENT HEALTH QUESTIONNAIRE - PHQ9
5. POOR APPETITE OR OVEREATING: NOT AT ALL
3. TROUBLE FALLING OR STAYING ASLEEP: NOT AT ALL
8. MOVING OR SPEAKING SO SLOWLY THAT OTHER PEOPLE COULD HAVE NOTICED. OR THE OPPOSITE, BEING SO FIGETY OR RESTLESS THAT YOU HAVE BEEN MOVING AROUND A LOT MORE THAN USUAL: NOT AT ALL
SUM OF ALL RESPONSES TO PHQ QUESTIONS 1-9: 0
9. THOUGHTS THAT YOU WOULD BE BETTER OFF DEAD, OR OF HURTING YOURSELF: NOT AT ALL
SUM OF ALL RESPONSES TO PHQ QUESTIONS 1-9: 0
7. TROUBLE CONCENTRATING ON THINGS, SUCH AS READING THE NEWSPAPER OR WATCHING TELEVISION: NOT AT ALL
SUM OF ALL RESPONSES TO PHQ QUESTIONS 1-9: 0
10. IF YOU CHECKED OFF ANY PROBLEMS, HOW DIFFICULT HAVE THESE PROBLEMS MADE IT FOR YOU TO DO YOUR WORK, TAKE CARE OF THINGS AT HOME, OR GET ALONG WITH OTHER PEOPLE: NOT DIFFICULT AT ALL
4. FEELING TIRED OR HAVING LITTLE ENERGY: NOT AT ALL
1. LITTLE INTEREST OR PLEASURE IN DOING THINGS: NOT AT ALL
6. FEELING BAD ABOUT YOURSELF - OR THAT YOU ARE A FAILURE OR HAVE LET YOURSELF OR YOUR FAMILY DOWN: NOT AT ALL
SUM OF ALL RESPONSES TO PHQ QUESTIONS 1-9: 0
2. FEELING DOWN, DEPRESSED OR HOPELESS: NOT AT ALL

## 2025-07-31 ASSESSMENT — ENCOUNTER SYMPTOMS
VOMITING: 0
WHEEZING: 1
SHORTNESS OF BREATH: 0
DIARRHEA: 0
NAUSEA: 0
CONSTIPATION: 0

## 2025-07-31 NOTE — PROGRESS NOTES
[facility]  Department of Family Medicine  Family Medicine Residency Program      Patient: Larry Bains 55 y.o. male     Date of Service: 7/31/25      Chief complaint:   Chief Complaint   Patient presents with    Establish Care     No questions or concerns, just here for general check up.        HISTORY OF PRESENTING ILLNESS     55 y.o. male presented to the clinic to establish with me. PMH COPD, Hypertension    Patient has been using spiriva, rescue inhaler for his COPD. Indicates that spiriva is helping quite a bit.   Denies any CP, palpitation, swelling. Denies any recent illness.     Has his blood pressure checked at home by his daughter and states it is usually slightly higher here at the office.     ROS otherwise negative.     Review of Systems:  Review of Systems   Constitutional:  Negative for chills, diaphoresis, fatigue and fever.   Respiratory:  Positive for wheezing. Negative for shortness of breath.    Cardiovascular:  Negative for chest pain and leg swelling.   Gastrointestinal:  Negative for constipation, diarrhea, nausea and vomiting.   Genitourinary:  Negative for difficulty urinating.        PHYSICAL EXAM     Vitals: BP (!) 148/80   Pulse 84   Temp 97.8 °F (36.6 °C) (Temporal)   Resp 16   Ht 1.854 m (6' 1\")   Wt 131.6 kg (290 lb 3.2 oz)   SpO2 96%   BMI 38.29 kg/m²     Physical Exam  Constitutional:       Appearance: Normal appearance.   HENT:      Head: Normocephalic and atraumatic.   Cardiovascular:      Rate and Rhythm: Normal rate and regular rhythm.      Heart sounds: Normal heart sounds. No murmur heard.     No friction rub. No gallop.   Pulmonary:      Effort: Pulmonary effort is normal. No respiratory distress.      Breath sounds: Wheezing (Faint wheezing lower right lobe.) present.   Skin:     General: Skin is warm and dry.   Neurological:      Mental Status: He is alert.           ASSESSMENT AND PLAN     HYPERTENSION  Patient is on amlodipine 5mg. Daughter

## 2025-07-31 NOTE — PROGRESS NOTES
[facility]  Pre[facility]ing [facility]    Subjective:  New to provider/follow up  Hx of HTN, COPD  On Norvasc and Spiriva, rare Albuterol use   Needs refills, no acute concerns    ROS otherwise negative    Past medical, surgical, family and social history were reviewed, non-contributory, and unchanged unless otherwise stated.    Objective:    BP (!) 148/80   Pulse 84   Temp 97.8 °F (36.6 °C) (Temporal)   Resp 16   Ht 1.854 m (6' 1\")   Wt 131.6 kg (290 lb 3.2 oz)   SpO2 96%   BMI 38.29 kg/m²     Exam is as noted by resident with the following changes, additions or corrections:    General:  NAD; alert & oriented x 3   Heart:  RRR, no murmurs, gallops, or rubs.  Lungs:  faint wheezing RLL  Abd: bowel sounds present, nontender, nondistended, no masses  Extrem:  No clubbing, cyanosis, or edema    Assessment/Plan:    HTN- monitors BP at home. Cont Norvasc  TUD- cutting down on his own.   COPD- cont Spiriva, Albuterol   Check CBC, CMP, Lipid   HM- address at follow up  Follow up 3 months      Attending Physician Statement  I have reviewed the chart, including any radiology or labs, and have seen the patient with the resident(s).  I personally reviewed and performed key elements of the history and exam.  I agree with the assessment, plan and orders as documented by the resident.  Please refer to the resident note for additional information.      Electronically signed by Amarilis Monaco MD on 7/31/2025 at 2:18 PM

## 2025-08-12 DIAGNOSIS — K21.9 GASTROESOPHAGEAL REFLUX DISEASE WITHOUT ESOPHAGITIS: ICD-10-CM

## 2025-08-12 DIAGNOSIS — M25.531 RIGHT WRIST PAIN: ICD-10-CM

## 2025-08-12 DIAGNOSIS — S66.312S: ICD-10-CM

## 2025-08-12 DIAGNOSIS — J44.9 CHRONIC OBSTRUCTIVE PULMONARY DISEASE, UNSPECIFIED COPD TYPE (HCC): ICD-10-CM

## 2025-08-13 RX ORDER — OMEPRAZOLE 20 MG/1
20 CAPSULE, DELAYED RELEASE ORAL DAILY
Qty: 30 CAPSULE | Refills: 2 | Status: SHIPPED | OUTPATIENT
Start: 2025-08-13